# Patient Record
Sex: MALE | Race: WHITE | NOT HISPANIC OR LATINO | Employment: OTHER
[De-identification: names, ages, dates, MRNs, and addresses within clinical notes are randomized per-mention and may not be internally consistent; named-entity substitution may affect disease eponyms.]

---

## 2017-01-11 ENCOUNTER — RECORDS - HEALTHEAST (OUTPATIENT)
Dept: ADMINISTRATIVE | Facility: OTHER | Age: 67
End: 2017-01-11

## 2017-01-13 ENCOUNTER — HOSPITAL ENCOUNTER (OUTPATIENT)
Dept: ULTRASOUND IMAGING | Facility: HOSPITAL | Age: 67
Discharge: HOME | End: 2017-01-13
Attending: SPECIALIST

## 2017-01-13 DIAGNOSIS — N43.2 OTHER HYDROCELE: ICD-10-CM

## 2017-04-06 ASSESSMENT — MIFFLIN-ST. JEOR: SCORE: 2030.45

## 2017-04-10 ENCOUNTER — SURGERY - HEALTHEAST (OUTPATIENT)
Dept: SURGERY | Facility: CLINIC | Age: 67
End: 2017-04-10

## 2017-04-10 ENCOUNTER — ANESTHESIA - HEALTHEAST (OUTPATIENT)
Dept: SURGERY | Facility: CLINIC | Age: 67
End: 2017-04-10

## 2019-01-24 ENCOUNTER — RECORDS - HEALTHEAST (OUTPATIENT)
Dept: LAB | Facility: CLINIC | Age: 69
End: 2019-01-24

## 2019-01-24 LAB
ALBUMIN SERPL-MCNC: 4.3 G/DL (ref 3.5–5)
ALP SERPL-CCNC: 53 U/L (ref 45–120)
ALT SERPL W P-5'-P-CCNC: 51 U/L (ref 0–45)
ANION GAP SERPL CALCULATED.3IONS-SCNC: 15 MMOL/L (ref 5–18)
AST SERPL W P-5'-P-CCNC: 44 U/L (ref 0–40)
BASOPHILS # BLD AUTO: 0 THOU/UL (ref 0–0.2)
BASOPHILS NFR BLD AUTO: 0 % (ref 0–2)
BILIRUB SERPL-MCNC: 1 MG/DL (ref 0–1)
BUN SERPL-MCNC: 19 MG/DL (ref 8–22)
CALCIUM SERPL-MCNC: 9.5 MG/DL (ref 8.5–10.5)
CHLORIDE BLD-SCNC: 102 MMOL/L (ref 98–107)
CHOLEST SERPL-MCNC: 124 MG/DL
CO2 SERPL-SCNC: 25 MMOL/L (ref 22–31)
CREAT SERPL-MCNC: 0.86 MG/DL (ref 0.7–1.3)
EOSINOPHIL # BLD AUTO: 0.1 THOU/UL (ref 0–0.4)
EOSINOPHIL NFR BLD AUTO: 1 % (ref 0–6)
ERYTHROCYTE [DISTWIDTH] IN BLOOD BY AUTOMATED COUNT: 12.2 % (ref 11–14.5)
FASTING STATUS PATIENT QL REPORTED: ABNORMAL
GFR SERPL CREATININE-BSD FRML MDRD: >60 ML/MIN/1.73M2
GLUCOSE BLD-MCNC: 93 MG/DL (ref 70–125)
HCT VFR BLD AUTO: 48.7 % (ref 40–54)
HDLC SERPL-MCNC: 36 MG/DL
HGB BLD-MCNC: 16.5 G/DL (ref 14–18)
LDLC SERPL CALC-MCNC: 57 MG/DL
LYMPHOCYTES # BLD AUTO: 2.4 THOU/UL (ref 0.8–4.4)
LYMPHOCYTES NFR BLD AUTO: 38 % (ref 20–40)
MCH RBC QN AUTO: 31.9 PG (ref 27–34)
MCHC RBC AUTO-ENTMCNC: 33.9 G/DL (ref 32–36)
MCV RBC AUTO: 94 FL (ref 80–100)
MONOCYTES # BLD AUTO: 0.6 THOU/UL (ref 0–0.9)
MONOCYTES NFR BLD AUTO: 9 % (ref 2–10)
NEUTROPHILS # BLD AUTO: 3.2 THOU/UL (ref 2–7.7)
NEUTROPHILS NFR BLD AUTO: 51 % (ref 50–70)
PLATELET # BLD AUTO: 237 THOU/UL (ref 140–440)
PMV BLD AUTO: 10.4 FL (ref 8.5–12.5)
POTASSIUM BLD-SCNC: 4.1 MMOL/L (ref 3.5–5)
PROT SERPL-MCNC: 7.5 G/DL (ref 6–8)
RBC # BLD AUTO: 5.18 MILL/UL (ref 4.4–6.2)
SODIUM SERPL-SCNC: 142 MMOL/L (ref 136–145)
TRIGL SERPL-MCNC: 155 MG/DL
WBC: 6.3 THOU/UL (ref 4–11)

## 2020-02-12 ENCOUNTER — RECORDS - HEALTHEAST (OUTPATIENT)
Dept: LAB | Facility: CLINIC | Age: 70
End: 2020-02-12

## 2020-02-12 LAB
ALBUMIN SERPL-MCNC: 4.1 G/DL (ref 3.5–5)
ALP SERPL-CCNC: 55 U/L (ref 45–120)
ALT SERPL W P-5'-P-CCNC: 62 U/L (ref 0–45)
ANION GAP SERPL CALCULATED.3IONS-SCNC: 11 MMOL/L (ref 5–18)
AST SERPL W P-5'-P-CCNC: 53 U/L (ref 0–40)
BILIRUB SERPL-MCNC: 0.7 MG/DL (ref 0–1)
BUN SERPL-MCNC: 16 MG/DL (ref 8–22)
CALCIUM SERPL-MCNC: 9.6 MG/DL (ref 8.5–10.5)
CHLORIDE BLD-SCNC: 103 MMOL/L (ref 98–107)
CHOLEST SERPL-MCNC: 118 MG/DL
CO2 SERPL-SCNC: 29 MMOL/L (ref 22–31)
CREAT SERPL-MCNC: 0.82 MG/DL (ref 0.7–1.3)
FASTING STATUS PATIENT QL REPORTED: YES
GFR SERPL CREATININE-BSD FRML MDRD: >60 ML/MIN/1.73M2
GLUCOSE BLD-MCNC: 111 MG/DL (ref 70–125)
HDLC SERPL-MCNC: 41 MG/DL
LDLC SERPL CALC-MCNC: 56 MG/DL
POTASSIUM BLD-SCNC: 4.2 MMOL/L (ref 3.5–5)
PROT SERPL-MCNC: 7.4 G/DL (ref 6–8)
SODIUM SERPL-SCNC: 143 MMOL/L (ref 136–145)
TRIGL SERPL-MCNC: 105 MG/DL

## 2020-03-19 ENCOUNTER — RECORDS - HEALTHEAST (OUTPATIENT)
Dept: ADMINISTRATIVE | Facility: OTHER | Age: 70
End: 2020-03-19

## 2020-04-01 ENCOUNTER — HOSPITAL ENCOUNTER (OUTPATIENT)
Dept: CARDIOLOGY | Facility: HOSPITAL | Age: 70
Discharge: HOME | End: 2020-04-01
Attending: FAMILY MEDICINE

## 2020-04-01 ENCOUNTER — HOSPITAL ENCOUNTER (OUTPATIENT)
Dept: NUCLEAR MEDICINE | Facility: HOSPITAL | Age: 70
Discharge: HOME | End: 2020-04-01
Attending: FAMILY MEDICINE

## 2020-04-01 DIAGNOSIS — I25.9 ISCHEMIC HEART DISEASE: ICD-10-CM

## 2020-04-01 LAB
CV STRESS CURRENT BP HE: NORMAL
CV STRESS CURRENT HR HE: 101
CV STRESS CURRENT HR HE: 102
CV STRESS CURRENT HR HE: 103
CV STRESS CURRENT HR HE: 104
CV STRESS CURRENT HR HE: 106
CV STRESS CURRENT HR HE: 106
CV STRESS CURRENT HR HE: 108
CV STRESS CURRENT HR HE: 114
CV STRESS CURRENT HR HE: 115
CV STRESS CURRENT HR HE: 115
CV STRESS CURRENT HR HE: 117
CV STRESS CURRENT HR HE: 89
CV STRESS CURRENT HR HE: 90
CV STRESS CURRENT HR HE: 92
CV STRESS DEVIATION TIME HE: NORMAL
CV STRESS ECHO PERCENT HR HE: NORMAL
CV STRESS EXERCISE STAGE HE: NORMAL
CV STRESS FINAL RESTING BP HE: NORMAL
CV STRESS FINAL RESTING HR HE: 102
CV STRESS MAX HR HE: 117
CV STRESS MAX TREADMILL GRADE HE: 0
CV STRESS MAX TREADMILL SPEED HE: 0
CV STRESS PEAK DIA BP HE: NORMAL
CV STRESS PEAK SYS BP HE: NORMAL
CV STRESS PHASE HE: NORMAL
CV STRESS PROTOCOL HE: NORMAL
CV STRESS RESTING PT POSITION HE: NORMAL
CV STRESS ST DEVIATION AMOUNT HE: NORMAL
CV STRESS ST DEVIATION ELEVATION HE: NORMAL
CV STRESS ST EVELATION AMOUNT HE: NORMAL
CV STRESS TEST TYPE HE: NORMAL
CV STRESS TOTAL STAGE TIME MIN 1 HE: NORMAL
NUC STRESS EJECTION FRACTION: 74 %
RATE PRESSURE PRODUCT: NORMAL
STRESS ECHO BASELINE DIASTOLIC HE: 91
STRESS ECHO BASELINE HR: 91
STRESS ECHO BASELINE SYSTOLIC BP: 136
STRESS ECHO CALCULATED PERCENT HR: 76 %
STRESS ECHO LAST STRESS DIASTOLIC BP: 78
STRESS ECHO LAST STRESS HR: 106
STRESS ECHO LAST STRESS SYSTOLIC BP: 125
STRESS ECHO TARGET HR: 153

## 2020-04-01 RX ORDER — ASPIRIN 325 MG
325 TABLET ORAL DAILY
Status: SHIPPED | COMMUNITY
Start: 2020-04-01

## 2021-01-27 ENCOUNTER — RECORDS - HEALTHEAST (OUTPATIENT)
Dept: LAB | Facility: CLINIC | Age: 71
End: 2021-01-27

## 2021-01-27 LAB
ALBUMIN SERPL-MCNC: 4.1 G/DL (ref 3.5–5)
ALP SERPL-CCNC: 58 U/L (ref 45–120)
ALT SERPL W P-5'-P-CCNC: 64 U/L (ref 0–45)
ANION GAP SERPL CALCULATED.3IONS-SCNC: 10 MMOL/L (ref 5–18)
AST SERPL W P-5'-P-CCNC: 64 U/L (ref 0–40)
BILIRUB SERPL-MCNC: 0.9 MG/DL (ref 0–1)
BUN SERPL-MCNC: 12 MG/DL (ref 8–22)
CALCIUM SERPL-MCNC: 9.1 MG/DL (ref 8.5–10.5)
CHLORIDE BLD-SCNC: 101 MMOL/L (ref 98–107)
CHOLEST SERPL-MCNC: 114 MG/DL
CO2 SERPL-SCNC: 30 MMOL/L (ref 22–31)
CREAT SERPL-MCNC: 0.79 MG/DL (ref 0.7–1.3)
FASTING STATUS PATIENT QL REPORTED: ABNORMAL
GFR SERPL CREATININE-BSD FRML MDRD: >60 ML/MIN/1.73M2
GLUCOSE BLD-MCNC: 119 MG/DL (ref 70–125)
HDLC SERPL-MCNC: 35 MG/DL
LDLC SERPL CALC-MCNC: 49 MG/DL
POTASSIUM BLD-SCNC: 4.3 MMOL/L (ref 3.5–5)
PROT SERPL-MCNC: 7.5 G/DL (ref 6–8)
PSA SERPL-MCNC: 0.7 NG/ML (ref 0–4.5)
SODIUM SERPL-SCNC: 141 MMOL/L (ref 136–145)
TRIGL SERPL-MCNC: 151 MG/DL

## 2021-01-29 ENCOUNTER — AMBULATORY - HEALTHEAST (OUTPATIENT)
Dept: ADMINISTRATIVE | Facility: CLINIC | Age: 71
End: 2021-01-29

## 2021-01-29 ENCOUNTER — COMMUNICATION - HEALTHEAST (OUTPATIENT)
Dept: SURGERY | Facility: CLINIC | Age: 71
End: 2021-01-29

## 2021-01-29 ENCOUNTER — OFFICE VISIT - HEALTHEAST (OUTPATIENT)
Dept: SURGERY | Facility: CLINIC | Age: 71
End: 2021-01-29

## 2021-01-29 DIAGNOSIS — K40.90 LEFT INGUINAL HERNIA: ICD-10-CM

## 2021-01-29 RX ORDER — LOSARTAN POTASSIUM 50 MG/1
50 TABLET ORAL DAILY
Status: SHIPPED | COMMUNITY
Start: 2021-01-23

## 2021-01-29 RX ORDER — HYDROCHLOROTHIAZIDE 12.5 MG/1
TABLET ORAL DAILY
Status: SHIPPED | COMMUNITY
Start: 2021-01-23

## 2021-01-31 ENCOUNTER — AMBULATORY - HEALTHEAST (OUTPATIENT)
Dept: SURGERY | Facility: HOSPITAL | Age: 71
End: 2021-01-31

## 2021-01-31 DIAGNOSIS — Z11.59 ENCOUNTER FOR SCREENING FOR OTHER VIRAL DISEASES: ICD-10-CM

## 2021-02-15 ENCOUNTER — AMBULATORY - HEALTHEAST (OUTPATIENT)
Dept: LAB | Facility: CLINIC | Age: 71
End: 2021-02-15

## 2021-02-15 ENCOUNTER — COMMUNICATION - HEALTHEAST (OUTPATIENT)
Dept: LAB | Facility: CLINIC | Age: 71
End: 2021-02-15

## 2021-02-15 DIAGNOSIS — Z11.59 ENCOUNTER FOR SCREENING FOR OTHER VIRAL DISEASES: ICD-10-CM

## 2021-02-15 LAB
SARS-COV-2 PCR COMMENT: ABNORMAL
SARS-COV-2 RNA SPEC QL NAA+PROBE: POSITIVE
SARS-COV-2 VIRUS SPECIMEN SOURCE: ABNORMAL

## 2021-02-16 ENCOUNTER — COMMUNICATION - HEALTHEAST (OUTPATIENT)
Dept: SURGERY | Facility: CLINIC | Age: 71
End: 2021-02-16

## 2021-02-17 ASSESSMENT — MIFFLIN-ST. JEOR: SCORE: 2025.92

## 2021-03-08 ENCOUNTER — RECORDS - HEALTHEAST (OUTPATIENT)
Dept: LAB | Facility: CLINIC | Age: 71
End: 2021-03-08

## 2021-03-10 ENCOUNTER — ANESTHESIA - HEALTHEAST (OUTPATIENT)
Dept: SURGERY | Facility: HOSPITAL | Age: 71
End: 2021-03-10

## 2021-03-10 LAB — BACTERIA SPEC CULT: NORMAL

## 2021-03-11 ENCOUNTER — SURGERY - HEALTHEAST (OUTPATIENT)
Dept: SURGERY | Facility: HOSPITAL | Age: 71
End: 2021-03-11

## 2021-03-11 ASSESSMENT — MIFFLIN-ST. JEOR: SCORE: 2025.92

## 2021-03-22 ENCOUNTER — OFFICE VISIT - HEALTHEAST (OUTPATIENT)
Dept: SURGERY | Facility: CLINIC | Age: 71
End: 2021-03-22

## 2021-03-22 DIAGNOSIS — K40.01 BILATERAL RECURRENT INGUINAL HERNIA WITH OBSTRUCTION AND WITHOUT GANGRENE: ICD-10-CM

## 2021-05-27 VITALS — SYSTOLIC BLOOD PRESSURE: 136 MMHG | DIASTOLIC BLOOD PRESSURE: 76 MMHG

## 2021-05-30 VITALS — HEIGHT: 69 IN | BODY MASS INDEX: 41.47 KG/M2 | WEIGHT: 280 LBS

## 2021-06-05 VITALS — HEIGHT: 69 IN | BODY MASS INDEX: 41.32 KG/M2 | WEIGHT: 279 LBS

## 2021-06-09 NOTE — ANESTHESIA PREPROCEDURE EVALUATION
Anesthesia Evaluation      Patient summary reviewed     Airway   Mallampati: II   Pulmonary - negative ROS and normal exam                          Cardiovascular - normal exam  (+) hypertension, ,      Neuro/Psych - negative ROS     Endo/Other    (+) obesity,      GI/Hepatic/Renal    (+) GERD,             Dental                         Anesthesia Plan  Planned anesthetic: general endotracheal    ASA 3   Induction: intravenous   Anesthetic plan and risks discussed with: patient            Yovany Oro

## 2021-06-09 NOTE — ANESTHESIA POSTPROCEDURE EVALUATION
Patient: Bubba Mclaughlin  RIGHT SCROTAL EXPLORATION, HYDROCELECTOMY,  RIGHT ORCHIECTOMY  Anesthesia type: general    Patient location: Phase II Recovery  Last vitals:   Vitals:    04/10/17 1130   BP: 138/74   Pulse: 88   Resp: 16   Temp:    SpO2: 96%     Post vital signs: stable  Level of consciousness: awake and responds to simple questions  Post-anesthesia pain: pain controlled  Post-anesthesia nausea and vomiting: no  Pulmonary: unassisted, return to baseline  Cardiovascular: stable and blood pressure at baseline  Hydration: adequate  Anesthetic events: no    QCDR Measures:  ASA# 11 - Leeanne-op Cardiac Arrest: ASA11B - Patient did NOT experience unanticipated cardiac arrest  ASA# 12 - Leeanne-op Mortality Rate: ASA12B - Patient did NOT die  ASA# 13 - PACU Re-Intubation Rate: ASA13B - Patient did NOT require a new airway mgmt  ASA# 10 - Composite Anes Safety: ASA10A - No serious adverse event  ASA# 38 - New Corneal Injury: ASA38A - No new exposure keratitis or corneal abrasion in PACU    Additional Notes:    Yovany Oro

## 2021-06-09 NOTE — ANESTHESIA CARE TRANSFER NOTE
Last vitals:   Vitals:    04/10/17 1007   BP: (!) 149/92   Pulse: 71   Resp: 12   Temp:    SpO2: 93%     Patient's level of consciousness is awake  Spontaneous respirations: yes  Maintains airway independently: yes  Dentition unchanged: yes  Oropharynx: oropharynx clear of all foreign objects    QCDR Measures:  ASA# 20 - Surgical Safety Checklist: ASA20A - Safety Checks Done  PQRS# 430 - Adult PONV Prevention: 4558F - Pt received => 2 anti-emetic agents (different classes) preop & intraop  ASA# 8 - Peds PONV Prevention: 4558F - Pt received => 2 anti-emetic agents (different classes) preop & intraop  PQRS# 424 - Leeanne-op Temp Management: 4559F - At least one body temp DOCUMENTED => 35.5C or 95.9F within required timeframe  PQRS# 426 - PACU Transfer Protocol: - Transfer of care checklist used  ASA# 14 - Acute Post-op Pain: ASA14B - Patient did NOT experience pain >= 7 out of 10   The patient is Spont Breathing, responding to commands,  Reflexes  Intact.  VSS    I completed my SBAR handoff to the receiving nurse per policy and procedure.

## 2021-06-11 ENCOUNTER — OFFICE VISIT - HEALTHEAST (OUTPATIENT)
Dept: SURGERY | Facility: CLINIC | Age: 71
End: 2021-06-11

## 2021-06-11 DIAGNOSIS — N50.89 OTHER SPECIFIED DISORDERS OF THE MALE GENITAL ORGANS: ICD-10-CM

## 2021-06-11 DIAGNOSIS — N50.89 SCROTAL MASS: ICD-10-CM

## 2021-06-11 DIAGNOSIS — K40.90 LEFT INGUINAL HERNIA: ICD-10-CM

## 2021-06-14 ENCOUNTER — AMBULATORY - HEALTHEAST (OUTPATIENT)
Dept: SURGERY | Facility: HOSPITAL | Age: 71
End: 2021-06-14

## 2021-06-14 DIAGNOSIS — Z11.59 ENCOUNTER FOR SCREENING FOR OTHER VIRAL DISEASES: ICD-10-CM

## 2021-06-15 NOTE — TELEPHONE ENCOUNTER
Coronavirus (COVID-19) Notification    Reason for call  Notify of POSITIVE  COVID-19 lab result, assess symptoms,  review [facility] recommendations    Lab Result   Lab test for 2019-nCoV rRt-PCR or SARS-COV-2 PCR  Oropharyngeal AND/OR nasopharyngeal swabs were POSITIVE for 2019-nCoV RNA [OR] SARS-COV-2 RNA (COVID-19) RNA     We have been unable to reach Patient by phone at this time to notify of their Positive COVID-19 result.  Left voicemail message requesting a call back to [phone] [facility] for results.        POSITIVE COVID-19 Letter sent.    Rosemarie Adamson LPN

## 2021-06-15 NOTE — TELEPHONE ENCOUNTER
Spoke with Bubba regarding his upcoming surgery and positive covid test yesterday. Informed Bubba that we do have to reschedule. Bubba agreed.     New Surgery Date: 3/11/21  Check in time: Approx. 7:00 AM    Bubba still has his surgery instructions and did not need new instructions.  No new covid test required for 90 days. Patient is aware.    Lucy WOOD  Surgery Scheduler  [facility]  Surgery [facility]  Direct line: [phone]   Main Line: [phone]  Direct Fax: [phone]

## 2021-06-15 NOTE — ANESTHESIA PREPROCEDURE EVALUATION
Anesthesia Evaluation      Patient summary reviewed   No history of anesthetic complications     Airway   Mallampati: II  Neck ROM: full   Pulmonary - negative ROS and normal exam   (-) shortness of breath                         Cardiovascular - normal exam  Exercise tolerance: > or = 4 METS  (+) hypertension, CAD (normal stress test march 2020), , hypercholesterolemia,     (-) angina  ECG reviewed        Neuro/Psych - negative ROS     Endo/Other    (+) arthritis, obesity (morbid),      GI/Hepatic/Renal    (+) GERD well controlled,             Dental - normal exam                        Anesthesia Plan  Planned anesthetic: general endotracheal  50 mg ketamine IV on induction.  Patient is COVID recovered since he had COVID last month; denies residual symptoms.    ASA 3   Induction: intravenous   Anesthetic plan and risks discussed with: patient  Anesthesia plan special considerations: antiemetics,   Post-op plan: routine recovery

## 2021-06-15 NOTE — TELEPHONE ENCOUNTER
"-Coronavirus (COVID-19) Notification    Caller Name (Patient, parent, daughter/son, grandparent, etc)  bam Mac    Reason for call  Notify of Positive Coronavirus (COVID-19) lab results, assess symptoms,  review  [facility] recommendations    Lab Result    Lab test:  2019-nCoV rRt-PCR or SARS-CoV-2 PCR    Oropharyngeal AND/OR nasopharyngeal swabs is POSITIVE for 2019-nCoV RNA/SARS-COV-2 PCR (COVID-19 virus)    RN Recommendations/Instructions per [facility] Coronavirus COVID-19 recommendations    Brief introduction script  Introduce self and then review script:  \"I am calling on behalf of [email or web address].  We were notified that your Coronavirus test (COVID-19) for was POSITIVE for the virus.  I have some information to relay to you but first I wanted to mention that the [place] Dept of Health will be contacting you shortly [it's possible MD already called Patient] to talk to you more about how you are feeling and other people you have had contact with who might now also have the virus.  Also,  [facility] is Partnering with the [facility] for Covid-19 research, you may be contacted directly by research staff.\"    Assessment (Inquire about Patient's current symptoms)   Assessment   Current Symptoms at time of phone call: (if no symptoms, document No symptoms] No symptoms   Symptom onset (if applicable) N/A     If at time of call, Patients symptoms hare worsened, the Patient should contact 911 or have someone drive them to Emergency Dept promptly:      If Patient calling 911, inform 911 personal that you have tested positive for the Coronavirus (COVID-19).  Place mask on and await 911 to arrive.    If Emergency Dept, If possible, please have another adult drive you to the Emergency Dept but you need to wear mask when in contact with other people.        Monoclonal Antibody Administration    If you had symptoms when tested for COVID19, you may be eligible to receive a new treatment with a " "monoclonal antibody for preventing hospitalization in patients at high risk for complications from COVID-19.   This medication is still experimental and available on a limited basis; it is given through an IV and must be given at an infusion center. Please note that not all people who are eligible will receive the medication since it is in limited supply.    Are you interested in being considered for this medication?  No.  Does the patient fit the criteria: Patient declined    If patient qualifies based on above criteria:  \"We will contact you if you are selected to receive the medication in the next 1-2 days.   This is time sensitive and if you are not selected in the next 1-2 days, you will not receive the medication.  If you do not receive a call to schedule, you have not been selected.\"    Review information with Patient    Your result was positive. This means you have COVID-19 (coronavirus).  We have sent you a letter that reviews the information that I'll be reviewing with you now.    How can I protect others?    If you have symptoms: stay home and away from others (self-isolate) until:    You've had no fever--and no medicine that reduces fever--for 1 full day (24 hours). And      Your other symptoms have gotten better. For example, your cough or breathing has improved. And     At least 10 days have passed since your symptoms started. (If you ve been told by a doctor that you have a weak immune system, wait 20 days.)     If you don't have symptoms: Stay home and away from others (self-isolate) until at least 10 days have passed since your first positive COVID-19 test. (Date test collected).    During this time:    Stay in your own room, including for meals. Use your own bathroom if you can.    Stay away from others in your home. No hugging, kissing or shaking hands. No visitors.     Don't go to work, school or anywhere else.     Clean  high touch  surfaces often (doorknobs, counters, handles, etc.). Use a " household cleaning spray or wipes. You'll find a full list on the EPA website at www.epa.gov/pesticide-registration/list-n-disinfectants-use-against-sars-cov-2.     Cover your mouth and nose with a mask, tissue or other face covering to avoid spreading germs.    Wash your hands and face often with soap and water.    Make a list of people you have been in close contact with recently, even if either of you wore a face covering.   ; Start your list from 2 days before you became ill or had a positive test.  ; Include anyone that was within 6 feet of you for a cumulative total of 15 minutes or more in 24 hours. (Example: if you sat next to Bubba for 5 minutes in the morning and 10 minutes in the afternoon, then you were in close contact for 15 minutes total that day. Bubba would be added to your list.)    A public health worker will call or text you. It is important that you answer. They will ask you questions about possible exposures to COVID-19, such as people you have been in direct contact with and places you have visited.    Tell the people on your list that you have COVID-19; they should stay away from others for 14 days starting from the last time they were in contact with you (unless you are told something different from a public health worker).     Caregivers in these groups are at risk for severe illness due to COVID-19:  o People 65 years and older  o People who live in a nursing home or long-term care facility  o People with chronic disease (lung, heart, cancer, diabetes, kidney, liver, immunologic)  o People who have a weakened immune system, including those who:  - Are in cancer treatment  - Take medicine that weakens the immune system, such as corticosteroids  - Had a bone marrow or organ transplant  - Have an immune deficiency  - Have poorly controlled HIV or AIDS  - Are obese (body mass index of 40 or higher)  - Smoke regularly    Caregivers should wear gloves while washing dishes, handling laundry and  cleaning bedrooms and bathrooms.    Wash and dry laundry with special caution. Don't shake dirty laundry, and use the warmest water setting you can.    If you have a weakened immune system, ask your doctor about other actions you should take.    For more tips, go to www.cdc.gov/coronavirus/2019-ncov/downloads/10Things.pdf.    You should not go back to work until you meet the guidelines above for ending your home isolation. You don't need to be retested for COVID-19 before going back to work--studies show that you won't spread the virus if it's been at least 10 days since your symptoms started (or 20 days, if you have a weak immune system).    Employers: This document serves as formal notice of your employee's medical guidelines for going back to work. They must meet the above guidelines before going back to work in person.    How can I take care of myself?    1. Get lots of rest. Drink extra fluids (unless a doctor has told you not to).    2. Take Tylenol (acetaminophen) for fever or pain. If you have liver or kidney problems, ask your family doctor if it's okay to take Tylenol.     Take either:     650 mg (two 325 mg pills) every 4 to 6 hours, or     1,000 mg (two 500 mg pills) every 8 hours as needed.     Note: Don't take more than 3,000 mg in one day. Acetaminophen is found in many medicines (both prescribed and over-the-counter medicines). Read all labels to be sure you don't take too much.    For children, check the Tylenol bottle for the right dose (based on their age or weight).    3. If you have other health problems (like cancer, heart failure, an organ transplant or severe kidney disease): Call your specialty clinic if you don't feel better in the next 2 days.    4. Know when to call 911: Emergency warning signs include:    Trouble breathing or shortness of breath    Pain or pressure in the chest that doesn't go away    Feeling confused like you haven't felt before, or not being able to wake  up    Bluish-colored lips or face    5. Sign up for [employer]. We know it's scary to hear that you have COVID-19. We want to track your symptoms to make sure you're okay over the next 2 weeks. Please look for an email from [employer]--this is a free, online program that we'll use to keep in touch. To sign up, follow the link in the email. Learn more at [email or web address].    Where can I get more information?    [facility]: [email or web address]    Coronavirus Basics: www.health.[email or web address]./diseases/coronavirus/basics.html    What to Do If You're Sick: www.cdc.gov/coronavirus/2019-ncov/about/steps-when-sick.html    Ending Home Isolation: www.cdc.gov/coronavirus/2019-ncov/hcp/disposition-in-home-patients.html     Caring for Someone with COVID-19: www.cdc.gov/coronavirus/2019-ncov/if-you-are-sick/care-for-someone.html     [facility] clinical trials (COVID-19 research studies): clinicalaffairs.[facility]-clinical-trials     A Positive COVID-19 letter will be sent via [employer] or the Mail.  (Exception, no letters sent to Presurgerical/Preprocedure Patients)    Krista Mcpherson LPN

## 2021-06-15 NOTE — ANESTHESIA CARE TRANSFER NOTE
Last vitals:   Vitals:    03/11/21 0959   BP: 134/84   Pulse: 81   Resp: 12   Temp: 36.9  C (98.5  F)   SpO2: 97%     Patient's level of consciousness is awake  Spontaneous respirations: yes  Maintains airway independently: yes  Dentition unchanged: yes  Oropharynx: oropharynx clear of all foreign objects    QCDR Measures:  ASA# 20 - Surgical Safety Checklist: WHO surgical safety checklist completed prior to induction    PQRS# 430 - Adult PONV Prevention: 4558F - Pt received => 2 anti-emetic agents (different classes) preop & intraop  ASA# 8 - Peds PONV Prevention: NA - Not pediatric patient, not GA or 2 or more risk factors NOT present  PQRS# 424 - Leeanne-op Temp Management: 4559F - At least one body temp DOCUMENTED => 35.5C or 95.9F within required timeframe  PQRS# 426 - PACU Transfer Protocol: - Transfer of care checklist used  ASA# 14 - Acute Post-op Pain: ASA14B - Patient did NOT experience pain >= 7 out of 10

## 2021-06-15 NOTE — ANESTHESIA POSTPROCEDURE EVALUATION
Patient: Bubba Mclaughlin  Procedure(s):  HERNIORRHAPHY ,BILATERAL  INGUINAL, ROBOT-ASSISTED, LAPAROSCOPIC, USING DA DONALD XI WITH MESH (Bilateral)  Anesthesia type: general    Patient location: PACU  Last vitals:   Vitals Value Taken Time   /79 03/11/21 1115   Temp 37  C (98.6  F) 03/11/21 1109   Pulse 87 03/11/21 1118   Resp 16 03/11/21 1109   SpO2 92 % 03/11/21 1118   Vitals shown include unvalidated device data.  Post vital signs: stable  Level of consciousness: awake and responds to simple questions  Post-anesthesia pain: pain controlled  Post-anesthesia nausea and vomiting: no  Pulmonary: unassisted, return to baseline  Cardiovascular: stable and blood pressure at baseline  Hydration: adequate  Anesthetic events: no    QCDR Measures:  ASA# 11 - Leeanne-op Cardiac Arrest: ASA11B - Patient did NOT experience unanticipated cardiac arrest  ASA# 12 - Leeanne-op Mortality Rate: ASA12B - Patient did NOT die  ASA# 13 - PACU Re-Intubation Rate: ASA13B - Patient did NOT require a new airway mgmt  ASA# 10 - Composite Anes Safety: ASA10A - No serious adverse event    Additional Notes:

## 2021-06-16 PROBLEM — M17.9 OSTEOARTHRITIS OF KNEE: Status: ACTIVE | Noted: 2021-01-29

## 2021-06-16 PROBLEM — I25.9 ISCHEMIC HEART DISEASE: Status: ACTIVE | Noted: 2021-01-29

## 2021-06-16 PROBLEM — R06.02 SHORTNESS OF BREATH: Status: ACTIVE | Noted: 2021-01-29

## 2021-06-16 PROBLEM — R42 VERTIGO: Status: ACTIVE | Noted: 2021-01-29

## 2021-06-16 PROBLEM — R05.9 COUGH: Status: ACTIVE | Noted: 2021-01-29

## 2021-06-16 PROBLEM — R74.8 ELEVATED LIVER ENZYMES: Status: ACTIVE | Noted: 2021-01-29

## 2021-06-16 PROBLEM — E66.01 MORBID OBESITY (H): Status: ACTIVE | Noted: 2021-01-29

## 2021-06-16 PROBLEM — E88.09 HYPOALBUMINEMIA: Status: ACTIVE | Noted: 2021-01-29

## 2021-06-16 PROBLEM — N43.3 HYDROCELE: Status: ACTIVE | Noted: 2021-01-29

## 2021-06-16 PROBLEM — K21.9 GASTRO-ESOPHAGEAL REFLUX DISEASE WITHOUT ESOPHAGITIS: Status: ACTIVE | Noted: 2021-06-11

## 2021-06-16 PROBLEM — L03.90 CELLULITIS: Status: ACTIVE | Noted: 2021-01-29

## 2021-06-16 PROBLEM — K40.90 LEFT INGUINAL HERNIA: Status: ACTIVE | Noted: 2021-01-29

## 2021-06-16 PROBLEM — N50.89 SCROTAL MASS: Status: ACTIVE | Noted: 2021-06-14

## 2021-06-16 PROBLEM — E78.5 HYPERLIPIDEMIA: Status: ACTIVE | Noted: 2017-06-01

## 2021-06-16 PROBLEM — K40.20 BILATERAL INGUINAL HERNIA WITHOUT OBSTRUCTION OR GANGRENE, RECURRENCE NOT SPECIFIED: Status: ACTIVE | Noted: 2021-01-29

## 2021-06-16 PROBLEM — Z98.890 OTHER SPECIFIED POSTPROCEDURAL STATES: Status: ACTIVE | Noted: 2021-01-29

## 2021-06-16 NOTE — PROGRESS NOTES
HPI: Pt is here for follow up of a bilateral robotic inguinal hernia repair.  He is doing well. His appetite is good, and bowel function regular.  No fevers or chills. Ambulating without problems.       There were no vitals taken for this visit.      EXAM: This is a  70 y.o. MAN in no distress  GENERAL: Appears well  CHEST clear  CVS S1S2 NSR  ABDOMEN: Soft, non-tender. incisions look good, repairs intact  EXT: warm, moves without difficulty      Assessment/Plan:   S/P robotic hernia repair  Doing well  Can return to normal activities.  Call for questions or concerns       Demian Vazquez MD  [facility] Department of Surgery

## 2021-06-21 ENCOUNTER — AMBULATORY - HEALTHEAST (OUTPATIENT)
Dept: LAB | Facility: CLINIC | Age: 71
End: 2021-06-21

## 2021-06-21 DIAGNOSIS — Z11.59 ENCOUNTER FOR SCREENING FOR OTHER VIRAL DISEASES: ICD-10-CM

## 2021-06-22 ENCOUNTER — COMMUNICATION - HEALTHEAST (OUTPATIENT)
Dept: SURGERY | Facility: CLINIC | Age: 71
End: 2021-06-22

## 2021-06-26 NOTE — TELEPHONE ENCOUNTER
Coronavirus (COVID-19) Notification    Reason for call  Notify of POSITIVE  COVID-19 lab result, assess symptoms,  review [facility] recommendations    Lab Result   Lab test for 2019-nCoV rRt-PCR or SARS-COV-2 PCR  Oropharyngeal AND/OR nasopharyngeal swabs were POSITIVE for 2019-nCoV RNA [OR] SARS-COV-2 RNA (COVID-19) RNA     We have been unable to reach Patient by phone at this time to notify of their Positive COVID-19 result.  Left voicemail message requesting a call back to [phone] [facility] for results.        POSITIVE COVID-19 Letter sent.    Renetta Portillo LPN

## 2021-06-26 NOTE — TELEPHONE ENCOUNTER
"-Coronavirus (COVID-19) Notification    Caller Name (Patient, parent, daughter/son, grandparent, etc)  Patient   Patient is planning surgery and agrees to discuss this test with them ASAP.  Patient had tested positive for Covid in Feb 2021 and is fully vaccinated.     Reason for call  Notify of Positive Coronavirus (COVID-19) lab results, assess symptoms,  review [facility] recommendations    Lab Result    Lab test:  2019-nCoV rRt-PCR or SARS-CoV-2 PCR    Oropharyngeal AND/OR nasopharyngeal swabs is POSITIVE for 2019-nCoV RNA/SARS-COV-2 PCR (COVID-19 virus)    RN Recommendations/Instructions per [facility] Coronavirus COVID-19 recommendations    Brief introduction script  Introduce self and then review script:  \"I am calling on behalf of [employer].  We were notified that your Coronavirus test (COVID-19) for was POSITIVE for the virus.  I have some information to relay to you but first I wanted to mention that the [place] Dept of Health will be contacting you shortly [it's possible MD already called Patient] to talk to you more about how you are feeling and other people you have had contact with who might now also have the virus.  Also, [facility] is Partnering with the [facility] for Covid-19 research, you may be contacted directly by research staff.\"    Assessment (Inquire about Patient's current symptoms)   Assessment   Current Symptoms at time of phone call: (if no symptoms, document No symptoms] No Sx   Symptom onset (if applicable) Tested positive yesterday     If at time of call, Patients symptoms hare worsened, the Patient should contact [facility] or have someone drive them to Emergency Dept promptly:      If Patient calling 911, inform 911 personal that you have tested positive for the Coronavirus (COVID-19).  Place mask on and await 911 to arrive.    If Emergency Dept, If possible, please have another adult drive you to the Emergency Dept but you need to wear mask when in contact " "with other people.      Monoclonal Antibody Administration    You may be eligible to receive a new treatment with a monoclonal antibody for preventing hospitalization in patients at high risk for complications from COVID-19.   This medication is still experimental and available on a limited basis; it is given through an IV and must be given at an infusion center. Please note that not all people who are eligible will receive the medication since it is in limited supply.     Are you interested in being considered for this medication?  No.  Does the patient fit the criteria: No    If patient qualifies based on above criteria:  \"You will be contacted if you are selected to receive this treatment in the next 1-2 business days.   This is time sensitive and if you are not selected in the next 1-2 business days, you will not receive the medication.  If you do not receive a call to schedule, you have not been selected.\"    Review information with Patient    Your result was positive. This means you have COVID-19 (coronavirus).  We have sent you a letter that reviews the information that I'll be reviewing with you now.    How can I protect others?    If you have symptoms: stay home and away from others (self-isolate) until:    You've had no fever--and no medicine that reduces fever--for 1 full day (24 hours). And      Your other symptoms have gotten better. For example, your cough or breathing has improved. And     At least 10 days have passed since your symptoms started. (If you ve been told by a doctor that you have a weak immune system, wait 20 days.)     If you don't have symptoms: Stay home and away from others (self-isolate) until at least 10 days have passed since your first positive COVID-19 test. (Date test collected).    During this time:    Stay in your own room, including for meals. Use your own bathroom if you can.    Stay away from others in your home. No hugging, kissing or shaking hands. No visitors.     Don't " go to work, school or anywhere else.     Clean  high touch  surfaces often (doorknobs, counters, handles, etc.). Use a household cleaning spray or wipes. You'll find a full list on the EPA website at www.epa.gov/pesticide-registration/list-n-disinfectants-use-against-sars-cov-2.     Cover your mouth and nose with a mask, tissue or other face covering to avoid spreading germs.    Wash your hands and face often with soap and water.    Make a list of people you have been in close contact with recently, even if either of you wore a face covering.   ; Start your list from 2 days before you became ill or had a positive test.  ; Include anyone that was within 6 feet of you for a cumulative total of 15 minutes or more in 24 hours. (Example: if you sat next to Bubba for 5 minutes in the morning and 10 minutes in the afternoon, then you were in close contact for 15 minutes total that day. Bubba would be added to your list.)    A public health worker will call or text you. It is important that you answer. They will ask you questions about possible exposures to COVID-19, such as people you have been in direct contact with and places you have visited.    Tell the people on your list that you have COVID-19; they should stay away from others for 14 days starting from the last time they were in contact with you (unless you are told something different from a public health worker).     Caregivers in these groups are at risk for severe illness due to COVID-19:  o People 65 years and older  o People who live in a nursing home or long-term care facility  o People with chronic disease (lung, heart, cancer, diabetes, kidney, liver, immunologic)  o People who have a weakened immune system, including those who:  - Are in cancer treatment  - Take medicine that weakens the immune system, such as corticosteroids  - Had a bone marrow or organ transplant  - Have an immune deficiency  - Have poorly controlled HIV or AIDS  - Are obese (body mass  index of 40 or higher)  - Smoke regularly    Caregivers should wear gloves while washing dishes, handling laundry and cleaning bedrooms and bathrooms.    Wash and dry laundry with special caution. Don't shake dirty laundry, and use the warmest water setting you can.    If you have a weakened immune system, ask your doctor about other actions you should take.    For more tips, go to www.cdc.gov/coronavirus/2019-ncov/downloads/10Things.pdf.    You should not go back to work until you meet the guidelines above for ending your home isolation. You don't need to be retested for COVID-19 before going back to work--studies show that you won't spread the virus if it's been at least 10 days since your symptoms started (or 20 days, if you have a weak immune system).    Employers: This document serves as formal notice of your employee's medical guidelines for going back to work. They must meet the above guidelines before going back to work in person.    How can I take care of myself?    1. Get lots of rest. Drink extra fluids (unless a doctor has told you not to).    2. Take Tylenol (acetaminophen) for fever or pain. If you have liver or kidney problems, ask your family doctor if it's okay to take Tylenol.     Take either:     650 mg (two 325 mg pills) every 4 to 6 hours, or     1,000 mg (two 500 mg pills) every 8 hours as needed.     Note: Don't take more than 3,000 mg in one day. Acetaminophen is found in many medicines (both prescribed and over-the-counter medicines). Read all labels to be sure you don't take too much.    For children, check the Tylenol bottle for the right dose (based on their age or weight).    3. If you have other health problems (like cancer, heart failure, an organ transplant or severe kidney disease): Call your specialty clinic if you don't feel better in the next 2 days.    4. Know when to call 911: Emergency warning signs include:    Trouble breathing or shortness of breath    Pain or pressure in the  chest that doesn't go away    Feeling confused like you haven't felt before, or not being able to wake up    Bluish-colored lips or face    5. Sign up for "[employer]. We know it's scary to hear that you have COVID-19. We want to track your symptoms to make sure you're okay over the next 2 weeks. Please look for an email from "[employer]--this is a free, online program that we'll use to keep in touch. To sign up, follow the link in the email. Learn more at [email or web address].    Where can I get more information?    [facility]: [email or web address]    Coronavirus Basics: www.health.[email or web address].[place]./diseases/coronavirus/basics.html    What to Do If You're Sick: www.cdc.gov/coronavirus/2019-ncov/about/steps-when-sick.html    Ending Home Isolation: www.cdc.gov/coronavirus/2019-ncov/hcp/disposition-in-home-patients.html     Caring for Someone with COVID-19: www.cdc.gov/coronavirus/2019-ncov/if-you-are-sick/care-for-someone.html     [facility] clinical trials (COVID-19 research studies): [email or web address].[facility]/n-clinical-trials     A Positive COVID-19 letter will be sent via [employer] or the Mail.  (Exception, no letters sent to Presurgerical/Preprocedure Patients)    Renetta Portillo LPN

## 2021-06-30 DIAGNOSIS — Z11.59 ENCOUNTER FOR SCREENING FOR OTHER VIRAL DISEASES: ICD-10-CM

## 2021-06-30 NOTE — PROGRESS NOTES
Progress Notes by Demian Vazquez MD at 1/29/2021  2:20 PM     Author: Demian Vazquez MD Service: -- Author Type: Physician    Filed: 2/6/2021  2:19 PM Encounter Date: 1/29/2021 Status: Signed    : Demian Vazquez MD (Physician)             HPI:  This is a 70 y.o. male here today with concerns of pain and bulging in his left groin area. He has noted this for the past few month(s). The symptoms have progressed and increased over this time. He comes in for evaluation secondary to the hernia causing enough issues to bother them with daily activities or chores.    Allergies:Patient has no known allergies.    Past Medical History:   Diagnosis Date   ? Family history of myocardial infarction     brother   ? GERD (gastroesophageal reflux disease)    ? High cholesterol    ? Hypertension        Past Surgical History:   Procedure Laterality Date   ? HERNIA REPAIR     ? HYDROCELE EXCISION / REPAIR N/A 4/10/2017    Procedure: RIGHT SCROTAL EXPLORATION, HYDROCELECTOMY;  Surgeon: Rajiv Ferris MD;  Location: [facility];  Service:    ? ORCHIECTOMY Right 4/10/2017    Procedure:  RIGHT ORCHIECTOMY;  Surgeon: Rajiv Ferris MD;  Location: [facility] OR;  Service:        CURRENT MEDS:      Family History   Problem Relation Age of Onset   ? Coronary artery disease Brother    ? Heart attack Brother         reports that he has never smoked. He has never used smokeless tobacco. He reports that he does not drink alcohol or use drugs.    Review of Systems - Negative except right hydrocele and orchiectomy in the past. Left large hernia with pain. Otherwise twelve system of review is negative.      Vitals:    01/29/21 1423   BP: 136/76       There is no height or weight on file to calculate BMI.    EXAM:  General: NAD   HEENT: normocephalic, PERRLA and EOMS intact  [place]: Mucus membranes moist  Neck: Supple  Chest: Clear to auscultation bilaterally  CV: RRR  ABD: Soft nontender and nondistended, left  inguinal hernia, reducible . Absent right testicle.   EXT: Warm, pulses intact,   Neuro: Alert and oriented x3  Back: no CVA tenderness      IMAGES:     Assessment/Plan: Pt with a left inguinal hernia. I discussed this at length with He.  I went over conservative management as well as surgical treatment of this.. I would plan on doing this via anrobotic  approach with possible use of mesh. I went over the small risks of surgery including but not limited to bleeding and infection, anesthesia, recurrence rates and nerve injury. I discussed the expected recovery time as well. Will get this scheduled. He will contact us to have this scheduled.      MD Demian Rivas MD, MD  General Surgery [phone]  Vascular Surgery [phone]

## 2021-07-04 NOTE — PROGRESS NOTES
Progress Notes by Demian Vazquez MD at 6/11/2021  1:00 PM     Author: Demian Vazquez MD Service: -- Author Type: Physician    Filed: 7/4/2021  4:29 PM Encounter Date: 6/11/2021 Status: Signed    : Demian Vazquez MD (Physician)       [facility] Surgery Follow up    HPI:    70 y.o. year old male who returns for a follow up. Returns with bulge in left groin again after robotic repair. Surgery was three months ago. Some pain and discomfort.    Allergies:Patient has no known allergies.    Past Medical History:   Diagnosis Date   ? Arthritis     osteo   ? Colon polyps    ? Diverticulosis    ? Dyslipidemia    ? Family history of myocardial infarction     brother   ? GERD (gastroesophageal reflux disease)    ? Hemorrhoids    ? High cholesterol    ? Hydrocele of testis     right   ? Hypertension        Past Surgical History:   Procedure Laterality Date   ? HERNIA REPAIR  2000,2006    and Dbl. hernia 2016   ? HYDROCELE EXCISION / REPAIR N/A 04/10/2017    Procedure: RIGHT SCROTAL EXPLORATION, HYDROCELECTOMY;  Surgeon: Rajiv Ferris MD;  Location: [facility] OR;  Service:    ? ORCHIECTOMY Right 04/10/2017    Procedure:  RIGHT ORCHIECTOMY;  Surgeon: Rajiv Ferris MD;  Location: [facility] OR;  Service:    ? scrotal exploration         CURRENT MEDS:  Current Outpatient Medications on File Prior to Visit   Medication Sig Dispense Refill   ? aspirin 325 MG tablet Take 325 mg by mouth daily.     ? atorvastatin (LIPITOR) 40 MG tablet Take 40 mg by mouth daily.      ? hydroCHLOROthiazide (HYDRODIURIL) 12.5 MG tablet Take by mouth daily.      ? lactose-reduced food (BOOST HIGH PROTEIN ORAL) Take by mouth daily.     ? losartan (COZAAR) 50 MG tablet Take 50 mg by mouth daily.      ? multivitamin therapeutic (THERAGRAN) tablet Take 1 tablet by mouth daily.     ? omeprazole (PRILOSEC) 20 MG capsule TAKE 1 CAPSULE BY MOUTH ONCE DAILY 30 MINUTES BEFORE MORNING MEAL     ? psyllium (METAMUCIL) 3.4 gram  packet Take 1 packet by mouth daily.        No current facility-administered medications on file prior to visit.        Family History   Problem Relation Age of Onset   ? Coronary artery disease Brother    ? Heart attack Brother         reports that he has never smoked. He has never used smokeless tobacco. He reports that he does not drink alcohol or use drugs.    Review of Systems:  Negative except left groin bulge. Otherwise twelve system of review is negative.      OBJECTIVE:  There were no vitals filed for this visit.  There is no height or weight on file to calculate BMI.    EXAM:  GENERAL: This is a well-developed 70 y.o. male who appears his stated age  HEAD: normocephalic  HEENT: Pupils equal and reactive bilaterally  CARDIAC: RRR without murmur  CHEST/LUNG:  Clear to auscultation  ABDOMEN: Soft, nontender, nondistended, recurrent left inguinal hernia most likely on exam.  NEUROLOGIC: Focally intact, nonfocal  VASCULAR: Pulses intact, symmetrical upper and lower extremities.      LABS:  Lab Results   Component Value Date    WBC 6.3 01/24/2019    HGB 16.5 01/24/2019    HCT 48.7 01/24/2019    MCV 94 01/24/2019     01/24/2019     INR/Prothrombin Time      No results found for: HGBA1C  Lab Results   Component Value Date    ALT 64 (H) 01/27/2021    AST 64 (H) 01/27/2021    ALKPHOS 58 01/27/2021    BILITOT 0.9 01/27/2021        Images:     CT Abdomen Pelvis With Oral With IV Contrast (Order [id number])  Imaging  Date: 6/11/2021 Department: [facility] [place] Released By/Authorizing: Demian Vazquez MD   Study Result       EXAM DATE:         06/11/2021     EXAM: CT ABDOMEN, PELVIS WITH CONTRAST  LOCATION: [facility]  DATE/TIME: 6/11/2021 2:00 PM     INDICATION: ABD PAIN, FEVER, POST-OP, NO ABSCESS ON CT WITHIN THE WEEK two hernia repairs with mass in left scrotum, possible recurrence.  COMPARISON: 01/13/2017 scrotal ultrasound and  07/13/2016 CT pelvis  TECHNIQUE: CT scan of the abdomen and pelvis was performed following injection of IV contrast. Multiplanar reformats were obtained. Dose reduction techniques were used.  CONTRAST: 100 ml Isovue 370 was administered via iv with 0 mls discarded. Istat CR= 1.0mg/dl, eGFR= 74     FINDINGS:  LOWER CHEST: Coronary artery and aortic valve calcification. Heart size normal with no pericardial effusion. Visualized lungs are clear with no pleural effusion.     HEPATOBILIARY: Moderate diffuse hepatic steatosis, borderline hepatic enlargement and slight contour undulation. Liver otherwise normal. No bile duct dilatation. Mild chronic appearing irregular gallbladder wall thickening but no calcified gallstones.     PANCREAS: Normal.     SPLEEN: Spleen size normal.     ADRENAL GLANDS: Normal.     KIDNEY/BLADDER: Four nonobstructing 2 mm left renal stones. Kidneys, ureters and bladder are otherwise normal.     BOWEL: Recurrent left inguinal hernia with a 5 cm diameter defect through which a large 20 x 10 x 10 cm hernia sac passes that contains multiple loops of unobstructed small bowel. Intact right inguinal herniorrhaphy. Normal variant incomplete intestinal   malrotation with failure of the duodenum to cross midline and the cecum in the right mid abdomen. Tiny fat-containing upper anterior midline ventral hernia. No bowel obstruction or inflammatory change.     LYMPH NODES: No lymphadenopathy.     VASCULATURE: Normal caliber abdominal aorta with mild calcified atheromatous plaque.     PELVIC ORGANS: Prostate enlargement. Pelvis otherwise unremarkable.     MUSCULOSKELETAL: Unremarkable.     IMPRESSION:  1.  Recurrent left inguinal hernia with a 5 cm diameter defect through which a large 20 x 10 x 10 cm hernia sac passes that contains multiple loops of unobstructed small bowel.  2.  Normal variant incomplete intestinal malrotation with failure of the duodenum to cross midline and the cecum in the right mid  abdomen.  3.  Moderate diffuse hepatic steatosis, borderline hepatic enlargement and slight contour undulation could indicate some degree of steatohepatitis and/or fibrosis.  4.  Mild chronic appearing irregular gallbladder wall thickening but no calcified gallstones.  5.  Small nonobstructing stones left kidney.     NOTE: ABNORMAL REPORT     THE DICTATION ABOVE DESCRIBES AN ABNORMALITY FOR WHICH FOLLOW-UP IS NEEDED.            Assessment/Plan:   1. Scrotal mass  Recurrent hernia    - CT Abdomen Pelvis With Oral With IV Contrast; Future  - CT Abdomen Pelvis With Oral With IV Contrast  - Skin prep - Clip hair as needed; Standing  - Apply 2% Chlorhexidine Gluconate cloth (Delfin Cloth) to surgical area.; Standing  - Insert and maintain IV; Standing  - sodium chloride bacteriostatic 0.9 % injection 0.1-0.3 mL  - sodium chloride flush 3 mL (NS)  - NaCl 0.9% IR 0.9 % irrigation solution 1,000 mL (NS)  - Case Request: HERNIORRHAPHY, INGUINAL, ROBOT-ASSISTED, LAPAROSCOPIC, USING DA DONALD XI; Standing  - Place sequential compression device; Standing  - Verify informed consent for Anesthesia; Standing  - Verify informed consent for procedure; Standing  - ceFAZolin 3 g in dextrose 5% 100 mL (ANCEF)  - Case Request: HERNIORRHAPHY, INGUINAL, ROBOT-ASSISTED, LAPAROSCOPIC, USING DA DONALD XI    2. Left inguinal hernia  We will do a repair of this laparoscopically or robotically.  This to be done in the hospital setting..    Discussed the risks and benefits procedure with him and answered all questions he understands risks of infection bleeding recurrence rates blood loss.  And the possibility of going open    - Skin prep - Clip hair as needed; Standing  - Apply 2% Chlorhexidine Gluconate cloth (Delfin Cloth) to surgical area.; Standing  - Insert and maintain IV; Standing  - sodium chloride bacteriostatic 0.9 % injection 0.1-0.3 mL  - sodium chloride flush 3 mL (NS)  - NaCl 0.9% IR 0.9 % irrigation solution 1,000 mL (NS)  - Case  Request: HERNIORRHAPHY, INGUINAL, ROBOT-ASSISTED, LAPAROSCOPIC, USING DA DONALD XI; Standing  - Place sequential compression device; Standing  - Verify informed consent for Anesthesia; Standing  - Verify informed consent for procedure; Standing  - ceFAZolin 3 g in dextrose 5% 100 mL (ANCEF)  - Case Request: HERNIORRHAPHY, INGUINAL, ROBOT-ASSISTED, LAPAROSCOPIC, USING DA DONALD XI      No follow-ups on file.     Demian Vazquez MD  [facility] Department of Surgery

## 2021-07-27 ENCOUNTER — LAB REQUISITION (OUTPATIENT)
Dept: LAB | Facility: CLINIC | Age: 71
End: 2021-07-27

## 2021-07-27 ENCOUNTER — TRANSFERRED RECORDS (OUTPATIENT)
Dept: HEALTH INFORMATION MANAGEMENT | Facility: CLINIC | Age: 71
End: 2021-07-27

## 2021-07-27 DIAGNOSIS — I10 ESSENTIAL (PRIMARY) HYPERTENSION: ICD-10-CM

## 2021-07-27 PROCEDURE — 36415 COLL VENOUS BLD VENIPUNCTURE: CPT | Performed by: PHYSICIAN ASSISTANT

## 2021-07-27 PROCEDURE — 80048 BASIC METABOLIC PNL TOTAL CA: CPT | Performed by: PHYSICIAN ASSISTANT

## 2021-07-28 LAB
ANION GAP SERPL CALCULATED.3IONS-SCNC: 14 MMOL/L (ref 5–18)
BUN SERPL-MCNC: 16 MG/DL (ref 8–22)
CALCIUM SERPL-MCNC: 9.7 MG/DL (ref 8.5–10.5)
CHLORIDE BLD-SCNC: 105 MMOL/L (ref 98–107)
CO2 SERPL-SCNC: 27 MMOL/L (ref 22–31)
CREAT SERPL-MCNC: 0.86 MG/DL (ref 0.7–1.3)
GFR SERPL CREATININE-BSD FRML MDRD: 89 ML/MIN/1.73M2
GLUCOSE BLD-MCNC: 89 MG/DL (ref 70–125)
POTASSIUM BLD-SCNC: 4 MMOL/L (ref 3.5–5)
SODIUM SERPL-SCNC: 146 MMOL/L (ref 136–145)

## 2021-07-29 ENCOUNTER — ANESTHESIA (OUTPATIENT)
Dept: SURGERY | Facility: HOSPITAL | Age: 71
End: 2021-07-29
Payer: COMMERCIAL

## 2021-07-29 ENCOUNTER — ANESTHESIA EVENT (OUTPATIENT)
Dept: SURGERY | Facility: HOSPITAL | Age: 71
End: 2021-07-29
Payer: COMMERCIAL

## 2021-07-29 ENCOUNTER — HOSPITAL ENCOUNTER (OUTPATIENT)
Facility: HOSPITAL | Age: 71
Discharge: HOME | End: 2021-07-29
Attending: SPECIALIST | Admitting: SPECIALIST
Payer: COMMERCIAL

## 2021-07-29 VITALS
HEART RATE: 76 BPM | OXYGEN SATURATION: 96 % | SYSTOLIC BLOOD PRESSURE: 129 MMHG | DIASTOLIC BLOOD PRESSURE: 68 MMHG | RESPIRATION RATE: 20 BRPM | TEMPERATURE: 97.8 F | BODY MASS INDEX: 37.48 KG/M2 | WEIGHT: 253.8 LBS

## 2021-07-29 PROCEDURE — 258N000003 HC RX IP 258 OP 636: Performed by: ANESTHESIOLOGY

## 2021-07-29 PROCEDURE — 370N000017 HC ANESTHESIA TECHNICAL FEE, PER MIN: Performed by: SPECIALIST

## 2021-07-29 PROCEDURE — 250N000009 HC RX 250: Performed by: NURSE ANESTHETIST, CERTIFIED REGISTERED

## 2021-07-29 PROCEDURE — 710N000012 HC RECOVERY PHASE 2, PER MINUTE: Performed by: SPECIALIST

## 2021-07-29 PROCEDURE — 250N000011 HC RX IP 250 OP 636: Performed by: NURSE ANESTHETIST, CERTIFIED REGISTERED

## 2021-07-29 PROCEDURE — 360N000080 HC SURGERY LEVEL 7, PER MIN: Performed by: SPECIALIST

## 2021-07-29 PROCEDURE — C1781 MESH (IMPLANTABLE): HCPCS | Performed by: SPECIALIST

## 2021-07-29 PROCEDURE — 710N000009 HC RECOVERY PHASE 1, LEVEL 1, PER MIN: Performed by: SPECIALIST

## 2021-07-29 PROCEDURE — 250N000011 HC RX IP 250 OP 636: Performed by: SPECIALIST

## 2021-07-29 PROCEDURE — S2900 ROBOTIC SURGICAL SYSTEM: HCPCS | Performed by: SPECIALIST

## 2021-07-29 PROCEDURE — 250N000025 HC SEVOFLURANE, PER MIN: Performed by: SPECIALIST

## 2021-07-29 PROCEDURE — 272N000001 HC OR GENERAL SUPPLY STERILE: Performed by: SPECIALIST

## 2021-07-29 PROCEDURE — 49651 LAP ING HERNIA REPAIR RECUR: CPT | Mod: 22 | Performed by: SPECIALIST

## 2021-07-29 PROCEDURE — 999N000141 HC STATISTIC PRE-PROCEDURE NURSING ASSESSMENT: Performed by: SPECIALIST

## 2021-07-29 DEVICE — LAPAROSCOPIC SELF-FIXATING MESH POLYESTER WITH POLYLACTIC ACID GRIPS AND COLLAGEN FILM
Type: IMPLANTABLE DEVICE | Site: PELVIS | Status: FUNCTIONAL
Brand: PROGRIP

## 2021-07-29 RX ORDER — MEPERIDINE HYDROCHLORIDE 25 MG/ML
12.5 INJECTION INTRAMUSCULAR; INTRAVENOUS; SUBCUTANEOUS EVERY 5 MIN PRN
Status: DISCONTINUED | OUTPATIENT
Start: 2021-07-29 | End: 2021-07-29 | Stop reason: HOSPADM

## 2021-07-29 RX ORDER — MEPERIDINE HYDROCHLORIDE 25 MG/ML
12.5 INJECTION INTRAMUSCULAR; INTRAVENOUS; SUBCUTANEOUS
Status: DISCONTINUED | OUTPATIENT
Start: 2021-07-29 | End: 2021-07-29 | Stop reason: HOSPADM

## 2021-07-29 RX ORDER — SODIUM CHLORIDE, SODIUM LACTATE, POTASSIUM CHLORIDE, CALCIUM CHLORIDE 600; 310; 30; 20 MG/100ML; MG/100ML; MG/100ML; MG/100ML
INJECTION, SOLUTION INTRAVENOUS CONTINUOUS
Status: DISCONTINUED | OUTPATIENT
Start: 2021-07-29 | End: 2021-07-29 | Stop reason: HOSPADM

## 2021-07-29 RX ORDER — MAGNESIUM SULFATE HEPTAHYDRATE 40 MG/ML
2 INJECTION, SOLUTION INTRAVENOUS ONCE
Status: DISCONTINUED | OUTPATIENT
Start: 2021-07-29 | End: 2021-07-29 | Stop reason: HOSPADM

## 2021-07-29 RX ORDER — OXYCODONE HYDROCHLORIDE 5 MG/1
5 TABLET ORAL EVERY 4 HOURS PRN
Status: DISCONTINUED | OUTPATIENT
Start: 2021-07-29 | End: 2021-07-29 | Stop reason: HOSPADM

## 2021-07-29 RX ORDER — DEXAMETHASONE SODIUM PHOSPHATE 10 MG/ML
INJECTION, SOLUTION INTRAMUSCULAR; INTRAVENOUS PRN
Status: DISCONTINUED | OUTPATIENT
Start: 2021-07-29 | End: 2021-07-29

## 2021-07-29 RX ORDER — PROPOFOL 10 MG/ML
INJECTION, EMULSION INTRAVENOUS PRN
Status: DISCONTINUED | OUTPATIENT
Start: 2021-07-29 | End: 2021-07-29

## 2021-07-29 RX ORDER — BUPIVACAINE HYDROCHLORIDE 2.5 MG/ML
INJECTION, SOLUTION INFILTRATION; PERINEURAL PRN
Status: DISCONTINUED | OUTPATIENT
Start: 2021-07-29 | End: 2021-07-29 | Stop reason: HOSPADM

## 2021-07-29 RX ORDER — HYDROXYZINE HYDROCHLORIDE 10 MG/1
10 TABLET, FILM COATED ORAL EVERY 6 HOURS PRN
Status: DISCONTINUED | OUTPATIENT
Start: 2021-07-29 | End: 2021-07-29 | Stop reason: HOSPADM

## 2021-07-29 RX ORDER — NALOXONE HYDROCHLORIDE 0.4 MG/ML
0.4 INJECTION, SOLUTION INTRAMUSCULAR; INTRAVENOUS; SUBCUTANEOUS
Status: DISCONTINUED | OUTPATIENT
Start: 2021-07-29 | End: 2021-07-29 | Stop reason: HOSPADM

## 2021-07-29 RX ORDER — ONDANSETRON 2 MG/ML
4 INJECTION INTRAMUSCULAR; INTRAVENOUS EVERY 30 MIN PRN
Status: DISCONTINUED | OUTPATIENT
Start: 2021-07-29 | End: 2021-07-29 | Stop reason: HOSPADM

## 2021-07-29 RX ORDER — NALOXONE HYDROCHLORIDE 0.4 MG/ML
0.2 INJECTION, SOLUTION INTRAMUSCULAR; INTRAVENOUS; SUBCUTANEOUS
Status: DISCONTINUED | OUTPATIENT
Start: 2021-07-29 | End: 2021-07-29 | Stop reason: HOSPADM

## 2021-07-29 RX ORDER — CEFAZOLIN SODIUM 2 G/100ML
INJECTION, SOLUTION INTRAVENOUS PRN
Status: DISCONTINUED | OUTPATIENT
Start: 2021-07-29 | End: 2021-07-29

## 2021-07-29 RX ORDER — HYDROMORPHONE HCL IN WATER/PF 6 MG/30 ML
0.2 PATIENT CONTROLLED ANALGESIA SYRINGE INTRAVENOUS EVERY 5 MIN PRN
Status: DISCONTINUED | OUTPATIENT
Start: 2021-07-29 | End: 2021-07-29 | Stop reason: HOSPADM

## 2021-07-29 RX ORDER — FENTANYL CITRATE 50 UG/ML
50 INJECTION, SOLUTION INTRAMUSCULAR; INTRAVENOUS EVERY 5 MIN PRN
Status: DISCONTINUED | OUTPATIENT
Start: 2021-07-29 | End: 2021-07-29 | Stop reason: HOSPADM

## 2021-07-29 RX ORDER — ACETAMINOPHEN 325 MG/1
975 TABLET ORAL ONCE
Status: DISCONTINUED | OUTPATIENT
Start: 2021-07-29 | End: 2021-07-29 | Stop reason: HOSPADM

## 2021-07-29 RX ORDER — KETAMINE HYDROCHLORIDE 10 MG/ML
INJECTION INTRAMUSCULAR; INTRAVENOUS PRN
Status: DISCONTINUED | OUTPATIENT
Start: 2021-07-29 | End: 2021-07-29

## 2021-07-29 RX ORDER — FENTANYL CITRATE 50 UG/ML
INJECTION, SOLUTION INTRAMUSCULAR; INTRAVENOUS PRN
Status: DISCONTINUED | OUTPATIENT
Start: 2021-07-29 | End: 2021-07-29

## 2021-07-29 RX ORDER — LIDOCAINE 40 MG/G
CREAM TOPICAL
Status: DISCONTINUED | OUTPATIENT
Start: 2021-07-29 | End: 2021-07-29 | Stop reason: HOSPADM

## 2021-07-29 RX ORDER — HALOPERIDOL 5 MG/ML
1 INJECTION INTRAMUSCULAR
Status: DISCONTINUED | OUTPATIENT
Start: 2021-07-29 | End: 2021-07-29 | Stop reason: HOSPADM

## 2021-07-29 RX ORDER — KETOROLAC TROMETHAMINE 15 MG/ML
15 INJECTION, SOLUTION INTRAMUSCULAR; INTRAVENOUS
Status: DISCONTINUED | OUTPATIENT
Start: 2021-07-29 | End: 2021-07-29 | Stop reason: HOSPADM

## 2021-07-29 RX ORDER — ONDANSETRON 2 MG/ML
INJECTION INTRAMUSCULAR; INTRAVENOUS PRN
Status: DISCONTINUED | OUTPATIENT
Start: 2021-07-29 | End: 2021-07-29

## 2021-07-29 RX ORDER — ONDANSETRON 4 MG/1
4 TABLET, ORALLY DISINTEGRATING ORAL EVERY 30 MIN PRN
Status: DISCONTINUED | OUTPATIENT
Start: 2021-07-29 | End: 2021-07-29 | Stop reason: HOSPADM

## 2021-07-29 RX ORDER — GLYCOPYRROLATE 0.2 MG/ML
INJECTION, SOLUTION INTRAMUSCULAR; INTRAVENOUS PRN
Status: DISCONTINUED | OUTPATIENT
Start: 2021-07-29 | End: 2021-07-29

## 2021-07-29 RX ORDER — LIDOCAINE HYDROCHLORIDE 20 MG/ML
INJECTION, SOLUTION INFILTRATION; PERINEURAL PRN
Status: DISCONTINUED | OUTPATIENT
Start: 2021-07-29 | End: 2021-07-29

## 2021-07-29 RX ADMIN — ROCURONIUM BROMIDE 10 MG: 10 INJECTION, SOLUTION INTRAVENOUS at 13:26

## 2021-07-29 RX ADMIN — ROCURONIUM BROMIDE 10 MG: 10 INJECTION, SOLUTION INTRAVENOUS at 14:05

## 2021-07-29 RX ADMIN — DEXAMETHASONE SODIUM PHOSPHATE 10 MG: 10 INJECTION, SOLUTION INTRAMUSCULAR; INTRAVENOUS at 12:35

## 2021-07-29 RX ADMIN — ROCURONIUM BROMIDE 50 MG: 10 INJECTION, SOLUTION INTRAVENOUS at 12:35

## 2021-07-29 RX ADMIN — LIDOCAINE HYDROCHLORIDE 60 MG: 20 INJECTION, SOLUTION INFILTRATION; PERINEURAL at 12:35

## 2021-07-29 RX ADMIN — ONDANSETRON 4 MG: 2 INJECTION INTRAMUSCULAR; INTRAVENOUS at 14:36

## 2021-07-29 RX ADMIN — SUGAMMADEX 400 MG: 100 INJECTION, SOLUTION INTRAVENOUS at 14:42

## 2021-07-29 RX ADMIN — SODIUM CHLORIDE, POTASSIUM CHLORIDE, SODIUM LACTATE AND CALCIUM CHLORIDE: 600; 310; 30; 20 INJECTION, SOLUTION INTRAVENOUS at 14:17

## 2021-07-29 RX ADMIN — SODIUM CHLORIDE, POTASSIUM CHLORIDE, SODIUM LACTATE AND CALCIUM CHLORIDE: 600; 310; 30; 20 INJECTION, SOLUTION INTRAVENOUS at 11:12

## 2021-07-29 RX ADMIN — FENTANYL CITRATE 50 MCG: 50 INJECTION, SOLUTION INTRAMUSCULAR; INTRAVENOUS at 14:14

## 2021-07-29 RX ADMIN — PROPOFOL 200 MG: 10 INJECTION, EMULSION INTRAVENOUS at 12:35

## 2021-07-29 RX ADMIN — KETAMINE HYDROCHLORIDE 50 MG: 10 INJECTION, SOLUTION INTRAMUSCULAR; INTRAVENOUS at 13:01

## 2021-07-29 RX ADMIN — FENTANYL CITRATE 100 MCG: 50 INJECTION, SOLUTION INTRAMUSCULAR; INTRAVENOUS at 12:35

## 2021-07-29 RX ADMIN — CEFAZOLIN SODIUM 2 G: 2 INJECTION, SOLUTION INTRAVENOUS at 12:40

## 2021-07-29 RX ADMIN — ROCURONIUM BROMIDE 10 MG: 10 INJECTION, SOLUTION INTRAVENOUS at 13:46

## 2021-07-29 RX ADMIN — GLYCOPYRROLATE 0.2 MCG: 0.2 INJECTION, SOLUTION INTRAMUSCULAR; INTRAVENOUS at 12:53

## 2021-07-29 RX ADMIN — FENTANYL CITRATE 50 MCG: 50 INJECTION, SOLUTION INTRAMUSCULAR; INTRAVENOUS at 13:46

## 2021-07-29 NOTE — OP NOTE
[facility] [place]  Operative Note    Pre-operative diagnosis: Scrotal mass [N50.89]  Left inguinal hernia [K40.90]   Post-operative diagnosis recurrent left inguinal hernia   Procedure: Procedure(s):  HERNIORRHAPHY, INGUINAL, ROBOT-ASSISTED, LAPAROSCOPIC, USING DA DONALD XI   Surgeon: Demian Vazquez MD   Assistants(s): none   Anesthesia: General    Estimated blood loss: Less than 10 ml    Total IV fluids: (See anesthesia record)   Blood transfusion: No transfusion was given during surgery   Total urine output: (See anesthesia record)   Drains: None   Specimens: None   Implants: Mesh progrip left inguinal   Findings:  Large recurrent left indirect hernia.  Difficult anatomy secondary to prior repairs x3 with really no visualized normal anatomy.  Severely thinned out peritoneum secondary to his prior repairs     Complications: None.   Condition: Stable       Description of procedure:      Consent obtained taken up and placed in supine position general endotracheal anesthesia was administered by anesthesia staff the Acosta was placed in the office prepped and draped sterile manner.  A briefing and timeout was performed anesthesia staff and or staff..  I began first by making incisions through the old robotic incisions and Visiport 5 mm scope was used to gain access to the peritoneal cavity.  We insufflated space to pressure 15 mils mercury CO2.  Upon doing so I placed an 8 mm robotic trocar lateral to this another area but it millimeter alert trocar lateral to that.  On the right and left sides.  Replaced the midline 5 mm trocar to a millimeter robotic trocar.  We docked the robot at this time point I changed my position over to the console.  At this time point I started to come the peritoneum in the same exact plane that had been done before I dissected the space with electrocautery very difficult dissection secondary to scarring from the prior pair in so doing eventually came in on account of the  old mesh which is rolled up and scarred anterior under the rectus muscle and I could see also mesh down in the hernia space from a prior repair as before.  I was able to get dissection eventually slowly meticulously and free of most of this peritoneum there were some peritoneal rents in seriousness in the process and so doing I placed a piece of appropriate progrip mesh in the space this tacked over the vascular structures and lied nicely cross midline over to the to Christopher's ligament and late anterior and to Christopher's ligament I sutured this superiorly with a 3-0 Vicryl suture to the rectus muscles in the lateral muscles out lateral and at this time point was pretty happy with where the mesh laid a difficult anatomy with a was to find the space for the vasculature was to the spermatic cord was presumed to be, scarred against abdominal wall inferiorly but it was not a very easy dissection or to find this.  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  At this time point I was happy with the repair and actually now spent at the next 20 minutes just repairing the peritoneum which I did so with 3 oh V-Loc suture.  X3 upon question of this I removed all the needles and we undocked the robot.  All sponge needle counts are correct ulcers were closed with 4-0 Monocryl subcuticular stitch Steri-Strips and sterile drapes were applied patient was extubated transferred to PACU in stable condition.  I did notice that injected 40 Marcaine incisions were leaving and he also got Toradol IV before leaving.        Demian Vazquez MD  General Surgery [phone]  Vascular Surgery [phone]

## 2021-07-29 NOTE — ANESTHESIA PROCEDURE NOTES
Airway       Patient location during procedure: OR       Procedure Start/Stop Times: 7/29/2021 12:37 PM  Staff -        Anesthesiologist:  Dorota Lopez MD       CRNA: Renetta Soriano APRN CRNA       Performed By: CRNA  Consent for Airway        Urgency: elective  Indications and Patient Condition       Indications for airway management: miguel-procedural       Induction type:intravenous       Mask difficulty assessment: 2 - vent by mask + OA or adjuvant +/- NMBA    Final Airway Details       Final airway type: endotracheal airway       Successful airway: ETT - single  Endotracheal Airway Details        ETT size (mm): 8.0       Cuffed: yes       Successful intubation technique: direct laryngoscopy       DL Blade Type: Chan 2       Grade View of Cords: 1       Adjucts: stylet and tooth guard       Position: Right       Measured from: gums/teeth       Secured at (cm): 22    Post intubation assessment        Placement verified by: capnometry, equal breath sounds and chest rise        Number of attempts at approach: 1       Secured with: silk tape       Ease of procedure: easy       Dentition: Intact and Unchanged

## 2021-07-29 NOTE — ANESTHESIA PREPROCEDURE EVALUATION
Anesthesia Pre-Procedure Evaluation    Patient: Bubba Mclaughlin   MRN: [id number] : 1950        Preoperative Diagnosis: Scrotal mass [N50.89]  Left inguinal hernia [K40.90]   Procedure : Procedure(s):  HERNIORRHAPHY, INGUINAL, ROBOT-ASSISTED, LAPAROSCOPIC, USING DA DONALD XI     Past Medical History:   Diagnosis Date     Arthritis     osteo     Colon polyps      Diverticulosis      Dyslipidemia      Family history of myocardial infarction     brother     GERD (gastroesophageal reflux disease)      Hemorrhoids      High cholesterol      Hydrocele of testis     right     Hypertension       Past Surgical History:   Procedure Laterality Date     HERNIA REPAIR  ,    and Dbl. hernia 2016     HYDROCELECTOMY SCROTAL N/A 04/10/2017    Procedure: RIGHT SCROTAL EXPLORATION, HYDROCELECTOMY;  Surgeon: Rajiv Ferris MD;  Location: [facility] Main OR;  Service:      ORCHIECTOMY SCROTAL Right 04/10/2017    Procedure:  RIGHT ORCHIECTOMY;  Surgeon: Rajiv Ferris MD;  Location: [facility] Main OR;  Service:      OTHER SURGICAL HISTORY      scrotal exploration      No Known Allergies   Social History     Tobacco Use     Smoking status: Never Smoker     Smokeless tobacco: Never Used   Substance Use Topics     Alcohol use: No      Wt Readings from Last 1 Encounters:   21 115.1 kg (253 lb 12.8 oz)        Anesthesia Evaluation            ROS/MED HX  ENT/Pulmonary:  - neg pulmonary ROS     Neurologic:  - neg neurologic ROS     Cardiovascular:     (+) hypertension-----    METS/Exercise Tolerance:     Hematologic:  - neg hematologic  ROS     Musculoskeletal:       GI/Hepatic:     (+) GERD,     Renal/Genitourinary:       Endo:     (+) Obesity,     Psychiatric/Substance Use:  - neg psychiatric ROS     Infectious Disease:       Malignancy:       Other:            Physical Exam    Airway  airway exam normal           Respiratory Devices and Support         Dental  no notable dental history         Cardiovascular    cardiovascular exam normal          Pulmonary   pulmonary exam normal                OUTSIDE LABS:  CBC:   Lab Results   Component Value Date    WBC 6.3 01/24/2019    HGB 16.5 01/24/2019    HCT 48.7 01/24/2019     01/24/2019     BMP:   Lab Results   Component Value Date     (H) 07/27/2021     01/27/2021    POTASSIUM 4.0 07/27/2021    POTASSIUM 4.3 01/27/2021    CHLORIDE 105 07/27/2021    CHLORIDE 101 01/27/2021    CO2 27 07/27/2021    CO2 30 01/27/2021    BUN 16 07/27/2021    BUN 12 01/27/2021    CR 0.86 07/27/2021    CR 0.79 01/27/2021    GLC 89 07/27/2021     01/27/2021     COAGS: No results found for: PTT, INR, FIBR  POC: No results found for: BGM, HCG, HCGS  HEPATIC:   Lab Results   Component Value Date    ALBUMIN 4.1 01/27/2021    PROTTOTAL 7.5 01/27/2021    ALT 64 (H) 01/27/2021    AST 64 (H) 01/27/2021    ALKPHOS 58 01/27/2021    BILITOTAL 0.9 01/27/2021     OTHER:   Lab Results   Component Value Date    GUSTAVO 9.7 07/27/2021       Anesthesia Plan    ASA Status:  3      Anesthesia Type: General.     - Airway: ETT   Induction: Intravenous.   Maintenance: Balanced.        Consents    Anesthesia Plan(s) and associated risks, benefits, and realistic alternatives discussed. Questions answered and patient/representative(s) expressed understanding.     - Discussed with:  Patient         Postoperative Care    Pain management: IV analgesics, Oral pain medications.   PONV prophylaxis: Ondansetron (or other 5HT-3), Dexamethasone or Solumedrol     Comments:                Dorota Lopez MD

## 2021-07-29 NOTE — OR NURSING
Patient asymptomatic post covid infection.  Several Positive covid tests previously (February 2021 and June 2021).  No covid test done today.       Laurie Chan paged regarding question on Covid status.  No response. Renetta SENA CRNA called and reported that Dr. Lopez was notified and patient was treated as recovered covid patient.

## 2021-07-29 NOTE — INTERVAL H&P NOTE
I have reviewed the surgical (or preoperative) H&P that is linked to this encounter, and examined the patient. There are no significant changes.        Demian Vazquez MD  General Surgery [phone]  Vascular Surgery [phone]

## 2021-07-29 NOTE — OR NURSING
Spoke with Laurie Chan, Infectious Disease, OK to not have patient in isolation.  OK not to have tested patient for covid today.

## 2021-07-29 NOTE — ANESTHESIA CARE TRANSFER NOTE
Patient: Bubba Mclaughlin    Procedure(s):  HERNIORRHAPHY, INGUINAL, ROBOT-ASSISTED, LAPAROSCOPIC, USING DA DONALD XI    Diagnosis: Scrotal mass [N50.89]  Left inguinal hernia [K40.90]  Diagnosis Additional Information: No value filed.    Anesthesia Type:   General     Note:    Oropharynx: oropharynx clear of all foreign objects and spontaneously breathing  Level of Consciousness: awake and drowsy  Oxygen Supplementation: face mask  Level of Supplemental Oxygen (L/min / FiO2): 6  Independent Airway: airway patency satisfactory and stable  Dentition: dentition unchanged  Vital Signs Stable: post-procedure vital signs reviewed and stable  Report to RN Given: handoff report given  Patient transferred to: PACU    Handoff Report: Identifed the Patient, Identified the Reponsible Provider, Reviewed the pertinent medical history, Discussed the surgical course, Reviewed Intra-OP anesthesia mangement and issues during anesthesia, Set expectations for post-procedure period and Allowed opportunity for questions and acknowledgement of understanding      Vitals:  Vitals Value Taken Time   /85 07/29/21 1453   Temp 36.4  C (97.5  F) 07/29/21 1453   Pulse 78 07/29/21 1456   Resp 24 07/29/21 1456   SpO2 97 % 07/29/21 1456   Vitals shown include unvalidated device data.    Electronically Signed By: DARIA Del Castillo CRNA  July 29, 2021  2:57 PM

## 2021-07-30 NOTE — ANESTHESIA POSTPROCEDURE EVALUATION
Patient: Bubba Mclaughlin    Procedure(s):  HERNIORRHAPHY, INGUINAL, ROBOT-ASSISTED, LAPAROSCOPIC, USING DA DONALD XI    Diagnosis:Scrotal mass [N50.89]  Left inguinal hernia [K40.90]  Diagnosis Additional Information: No value filed.    Anesthesia Type:  General    Note:  Disposition: Outpatient   Postop Pain Control: Uneventful            Sign Out: Well controlled pain   PONV: No   Neuro/Psych: Uneventful            Sign Out: Acceptable/Baseline neuro status   Airway/Respiratory: Uneventful            Sign Out: Acceptable/Baseline resp. status   CV/Hemodynamics: Uneventful            Sign Out: Acceptable CV status; No obvious hypovolemia; No obvious fluid overload   Other NRE: NONE   DID A NON-ROUTINE EVENT OCCUR? No           Last vitals:  Vitals Value Taken Time   /74 07/29/21 1600   Temp 36.4  C (97.6  F) 07/29/21 1545   Pulse 69 07/29/21 1612   Resp 33 07/29/21 1611   SpO2 98 % 07/29/21 1613   Vitals shown include unvalidated device data.    Electronically Signed By: Malik Sands MD  July 29, 2021  7:17 PM

## 2021-08-09 ENCOUNTER — LAB REQUISITION (OUTPATIENT)
Dept: LAB | Facility: CLINIC | Age: 71
End: 2021-08-09
Payer: COMMERCIAL

## 2021-08-09 DIAGNOSIS — Z03.818 ENCOUNTER FOR OBSERVATION FOR SUSPECTED EXPOSURE TO OTHER BIOLOGICAL AGENTS RULED OUT: ICD-10-CM

## 2021-08-09 PROCEDURE — U0005 INFEC AGEN DETEC AMPLI PROBE: HCPCS | Mod: ORL | Performed by: FAMILY MEDICINE

## 2021-08-09 PROCEDURE — 86769 SARS-COV-2 COVID-19 ANTIBODY: CPT | Mod: ORL | Performed by: FAMILY MEDICINE

## 2021-08-10 LAB — SARS-COV-2 RNA RESP QL NAA+PROBE: NEGATIVE

## 2021-08-16 ENCOUNTER — OFFICE VISIT (OUTPATIENT)
Dept: SURGERY | Facility: CLINIC | Age: 71
End: 2021-08-16
Payer: COMMERCIAL

## 2021-08-16 VITALS — SYSTOLIC BLOOD PRESSURE: 128 MMHG | DIASTOLIC BLOOD PRESSURE: 76 MMHG

## 2021-08-16 DIAGNOSIS — K40.90 LEFT INGUINAL HERNIA: Primary | ICD-10-CM

## 2021-08-16 PROCEDURE — 99024 POSTOP FOLLOW-UP VISIT: CPT | Performed by: SPECIALIST

## 2021-08-16 NOTE — LETTER
8/16/2021         RE: Bubba Mclaughlin  [address] [place] [address]        Dear Colleague,    Thank you for referring your patient, Bubba Mclaughlin, to the [facility] [place]. Please see a copy of my visit note below.    [facility] Surgery Follow up    HPI:    70 year old year old male who returns for a follow up. S/P a robotic redo left inguinal hernia repair. Doing well swelling of left scrotum decreasing but still there.    Allergies:Patient has no known allergies.    Past Medical History:   Diagnosis Date     Arthritis     osteo     Colon polyps      Diverticulosis      Dyslipidemia      Family history of myocardial infarction     brother     GERD (gastroesophageal reflux disease)      Hemorrhoids      High cholesterol      Hydrocele of testis     right     Hypertension        Past Surgical History:   Procedure Laterality Date     DAVINCI XI HERNIORRHAPHY INGUINAL Left 7/29/2021    Procedure: HERNIORRHAPHY, INGUINAL, ROBOT-ASSISTED, LAPAROSCOPIC, USING DA DONALD XI;  Surgeon: Demian Vazquez MD;  [place] [facility] [place]  [place]    and [facility] hernia 2016     HYDROCELECTOMY SCROTAL N/A 04/10/2017    Procedure: RIGHT SCROTAL EXPLORATION, HYDROCELECTOMY;  Surgeon: Rajiv Ferris MD;  Location: [facility];  Service:      ORCHIECTOMY SCROTAL Right 04/10/2017    Procedure:  RIGHT ORCHIECTOMY;  Surgeon: Rajiv Ferris MD;  Location: [facility];  Service:      OTHER SURGICAL HISTORY      scrotal exploration       CURRENT MEDS:  Current Outpatient Medications   Medication     aspirin 325 MG tablet     atorvastatin (LIPITOR) 40 MG tablet     hydroCHLOROthiazide (HYDRODIURIL) 12.5 MG tablet     lactose-reduced food (BOOST HIGH PROTEIN ORAL)     losartan (COZAAR) 50 MG tablet     multivitamin therapeutic (THERAGRAN) tablet     omeprazole (PRILOSEC) 20 MG capsule     psyllium (METAMUCIL) 3.4 gram packet     No current  facility-administered medications for this visit.       Family History   Problem Relation Age of Onset     Coronary Artery Disease Brother         reports that he has never smoked. He has never used smokeless tobacco. He reports that he does not drink alcohol and does not use drugs.    Review of Systems:  Negative except still some scrotal swelling, no pain and no groin bulging. Otherwise twelve system of review is negative.      OBJECTIVE:  Vitals:    08/16/21 0852   BP: 128/76   BP Location: Right arm   Patient Position: Sitting   Cuff Size: Adult Regular     There is no height or weight on file to calculate BMI.    EXAM:  GENERAL: This is a well-developed 70 year old male who appears his stated age  HEAD: normocephalic  HEENT: Pupils equal and reactive bilaterally  CARDIAC: RRR without murmur  CHEST/LUNG:  Clear to auscultation  ABDOMEN: Soft, nontender, nondistended, no masses, repair intact, seroma in left scrotum from repair.   NEUROLOGIC: Focally intact, nonfocal  VASCULAR: Pulses intact, symmetrical upper and lower extremities.        LABS:  Lab Results   Component Value Date    WBC 6.3 01/24/2019    HGB 16.5 01/24/2019    HCT 48.7 01/24/2019    MCV 94 01/24/2019     01/24/2019     INR/Prothrombin Time  @LABRCNTIP(NA,K,CL,co2,bun,creatinine,labglom,glucose,calcium)@  No results found for: HGBA1C  Lab Results   Component Value Date    ALT 64 (H) 01/27/2021    AST 64 (H) 01/27/2021    ALKPHOS 58 01/27/2021          Assessment/Plan:   S/P robotic redo repair of left inguinal repair.   Doing ok,  Seroma is decreasing.  2-3 more weeks of restricted activity.   Follow up as needed.     No follow-ups on file.     Demian Vazquez MD ,MD  [facility] Department of Surgery      Again, thank you for allowing me to participate in the care of your patient.        Sincerely,        Demian Vazquez MD

## 2021-08-18 NOTE — PROGRESS NOTES
[facility] Surgery Follow up    HPI:    70 year old year old male who returns for a follow up. S/P a robotic redo left inguinal hernia repair. Doing well swelling of left scrotum decreasing but still there.    Allergies:Patient has no known allergies.    Past Medical History:   Diagnosis Date     Arthritis     osteo     Colon polyps      Diverticulosis      Dyslipidemia      Family history of myocardial infarction     brother     GERD (gastroesophageal reflux disease)      Hemorrhoids      High cholesterol      Hydrocele of testis     right     Hypertension        Past Surgical History:   Procedure Laterality Date     DAVINCI XI HERNIORRHAPHY INGUINAL Left 7/29/2021    Procedure: HERNIORRHAPHY, INGUINAL, ROBOT-ASSISTED, LAPAROSCOPIC, USING DA DONALD XI;  Surgeon: Demian Vazquez MD;  [place] [facility] [place]     HERNIA REPAIR  2000,2006    and [facility]. hernia 2016     HYDROCELECTOMY SCROTAL N/A 04/10/2017    Procedure: RIGHT SCROTAL EXPLORATION, HYDROCELECTOMY;  Surgeon: Rajiv Ferris MD;  Location: [facility];  Service:      ORCHIECTOMY SCROTAL Right 04/10/2017    Procedure:  RIGHT ORCHIECTOMY;  Surgeon: Rajiv Ferris MD;  Location: [facility];  Service:      OTHER SURGICAL HISTORY      scrotal exploration       CURRENT MEDS:  Current Outpatient Medications   Medication     aspirin 325 MG tablet     atorvastatin (LIPITOR) 40 MG tablet     hydroCHLOROthiazide (HYDRODIURIL) 12.5 MG tablet     lactose-reduced food (BOOST HIGH PROTEIN ORAL)     losartan (COZAAR) 50 MG tablet     multivitamin therapeutic (THERAGRAN) tablet     omeprazole (PRILOSEC) 20 MG capsule     psyllium (METAMUCIL) 3.4 gram packet     No current facility-administered medications for this visit.       Family History   Problem Relation Age of Onset     Coronary Artery Disease Brother         reports that he has never smoked. He has never used smokeless tobacco. He reports that he does not drink alcohol and does not use  drugs.    Review of Systems:  Negative except still some scrotal swelling, no pain and no groin bulging. Otherwise twelve system of review is negative.      OBJECTIVE:  Vitals:    08/16/21 0852   BP: 128/76   BP Location: Right arm   Patient Position: Sitting   Cuff Size: Adult Regular     There is no height or weight on file to calculate BMI.    EXAM:  GENERAL: This is a well-developed 70 year old male who appears his stated age  HEAD: normocephalic  HEENT: Pupils equal and reactive bilaterally  CARDIAC: RRR without murmur  CHEST/LUNG:  Clear to auscultation  ABDOMEN: Soft, nontender, nondistended, no masses, repair intact, seroma in left scrotum from repair.   NEUROLOGIC: Focally intact, nonfocal  VASCULAR: Pulses intact, symmetrical upper and lower extremities.        LABS:  Lab Results   Component Value Date    WBC 6.3 01/24/2019    HGB 16.5 01/24/2019    HCT 48.7 01/24/2019    MCV 94 01/24/2019     01/24/2019     INR/Prothrombin Time  @LABRCNTIP(NA,K,CL,co2,bun,creatinine,labglom,glucose,calcium)@  No results found for: HGBA1C  Lab Results   Component Value Date    ALT 64 (H) 01/27/2021    AST 64 (H) 01/27/2021    ALKPHOS 58 01/27/2021          Assessment/Plan:   S/P robotic redo repair of left inguinal repair.   Doing ok,  Seroma is decreasing.  2-3 more weeks of restricted activity.   Follow up as needed.     No follow-ups on file.     Demian Vazquez MD ,MD  [facility] Department of Surgery

## 2021-08-19 LAB
SARS-COV-2 AB SERPL IA-ACNC: NORMAL [IU]/ML
SARS-COV-2 AB SERPL-IMP: POSITIVE

## 2021-08-26 ENCOUNTER — LAB REQUISITION (OUTPATIENT)
Dept: LAB | Facility: CLINIC | Age: 71
End: 2021-08-26

## 2021-08-26 DIAGNOSIS — E87.0 HYPEROSMOLALITY AND HYPERNATREMIA: ICD-10-CM

## 2021-08-26 LAB — SODIUM SERPL-SCNC: 141 MMOL/L (ref 136–145)

## 2021-08-26 PROCEDURE — 84295 ASSAY OF SERUM SODIUM: CPT | Performed by: PHYSICIAN ASSISTANT

## 2021-10-05 ENCOUNTER — LAB REQUISITION (OUTPATIENT)
Dept: LAB | Facility: CLINIC | Age: 71
End: 2021-10-05
Payer: COMMERCIAL

## 2021-10-05 DIAGNOSIS — Z03.818 ENCOUNTER FOR OBSERVATION FOR SUSPECTED EXPOSURE TO OTHER BIOLOGICAL AGENTS RULED OUT: ICD-10-CM

## 2021-10-05 PROCEDURE — U0003 INFECTIOUS AGENT DETECTION BY NUCLEIC ACID (DNA OR RNA); SEVERE ACUTE RESPIRATORY SYNDROME CORONAVIRUS 2 (SARS-COV-2) (CORONAVIRUS DISEASE [COVID-19]), AMPLIFIED PROBE TECHNIQUE, MAKING USE OF HIGH THROUGHPUT TECHNOLOGIES AS DESCRIBED BY CMS-2020-01-R: HCPCS | Mod: ORL | Performed by: PHYSICIAN ASSISTANT

## 2021-10-06 LAB — SARS-COV-2 RNA RESP QL NAA+PROBE: NEGATIVE

## 2022-06-01 ENCOUNTER — LAB REQUISITION (OUTPATIENT)
Dept: LAB | Facility: CLINIC | Age: 72
End: 2022-06-01

## 2022-06-01 DIAGNOSIS — E78.5 HYPERLIPIDEMIA, UNSPECIFIED: ICD-10-CM

## 2022-06-01 DIAGNOSIS — I10 ESSENTIAL (PRIMARY) HYPERTENSION: ICD-10-CM

## 2022-06-01 DIAGNOSIS — Z12.5 ENCOUNTER FOR SCREENING FOR MALIGNANT NEOPLASM OF PROSTATE: ICD-10-CM

## 2022-06-01 LAB
ALBUMIN SERPL-MCNC: 3.8 G/DL (ref 3.5–5)
ALP SERPL-CCNC: 67 U/L (ref 45–120)
ALT SERPL W P-5'-P-CCNC: 33 U/L (ref 0–45)
ANION GAP SERPL CALCULATED.3IONS-SCNC: 15 MMOL/L (ref 5–18)
AST SERPL W P-5'-P-CCNC: 37 U/L (ref 0–40)
BILIRUB SERPL-MCNC: 1.2 MG/DL (ref 0–1)
BUN SERPL-MCNC: 14 MG/DL (ref 8–22)
CALCIUM SERPL-MCNC: 9.2 MG/DL (ref 8.5–10.5)
CHLORIDE BLD-SCNC: 105 MMOL/L (ref 98–107)
CHOLEST SERPL-MCNC: 95 MG/DL
CO2 SERPL-SCNC: 23 MMOL/L (ref 22–31)
CREAT SERPL-MCNC: 0.77 MG/DL (ref 0.7–1.3)
FASTING STATUS PATIENT QL REPORTED: ABNORMAL
GFR SERPL CREATININE-BSD FRML MDRD: >90 ML/MIN/1.73M2
GLUCOSE BLD-MCNC: 112 MG/DL (ref 70–125)
HDLC SERPL-MCNC: 33 MG/DL
LDLC SERPL CALC-MCNC: 37 MG/DL
POTASSIUM BLD-SCNC: 4.2 MMOL/L (ref 3.5–5)
PROT SERPL-MCNC: 7.2 G/DL (ref 6–8)
PSA SERPL-MCNC: 0.72 UG/L (ref 0–4.5)
SODIUM SERPL-SCNC: 143 MMOL/L (ref 136–145)
TRIGL SERPL-MCNC: 126 MG/DL

## 2022-06-01 PROCEDURE — G0103 PSA SCREENING: HCPCS | Performed by: PHYSICIAN ASSISTANT

## 2022-06-01 PROCEDURE — 80061 LIPID PANEL: CPT | Performed by: PHYSICIAN ASSISTANT

## 2022-06-01 PROCEDURE — 80053 COMPREHEN METABOLIC PANEL: CPT | Performed by: PHYSICIAN ASSISTANT

## 2022-07-31 ENCOUNTER — HOSPITAL ENCOUNTER (EMERGENCY)
Facility: HOSPITAL | Age: 72
Discharge: HOME | End: 2022-07-31
Attending: EMERGENCY MEDICINE | Admitting: EMERGENCY MEDICINE
Payer: COMMERCIAL

## 2022-07-31 VITALS
DIASTOLIC BLOOD PRESSURE: 78 MMHG | BODY MASS INDEX: 38.22 KG/M2 | WEIGHT: 273 LBS | TEMPERATURE: 97.2 F | RESPIRATION RATE: 20 BRPM | SYSTOLIC BLOOD PRESSURE: 141 MMHG | HEART RATE: 117 BPM | HEIGHT: 71 IN | OXYGEN SATURATION: 95 %

## 2022-07-31 DIAGNOSIS — R20.2 ARM PARESTHESIA, LEFT: ICD-10-CM

## 2022-07-31 PROCEDURE — 99283 EMERGENCY DEPT VISIT LOW MDM: CPT

## 2022-07-31 PROCEDURE — 93005 ELECTROCARDIOGRAM TRACING: CPT | Performed by: EMERGENCY MEDICINE

## 2022-07-31 PROCEDURE — 93005 ELECTROCARDIOGRAM TRACING: CPT | Performed by: STUDENT IN AN ORGANIZED HEALTH CARE EDUCATION/TRAINING PROGRAM

## 2022-07-31 RX ORDER — METHYLPREDNISOLONE 4 MG
TABLET, DOSE PACK ORAL
Qty: 21 TABLET | Refills: 0 | Status: SHIPPED | OUTPATIENT
Start: 2022-07-31 | End: 2023-07-14

## 2022-07-31 ASSESSMENT — ENCOUNTER SYMPTOMS
SHORTNESS OF BREATH: 0
NECK STIFFNESS: 1
COUGH: 0
HEADACHES: 0
SPEECH DIFFICULTY: 0
NUMBNESS: 1
FEVER: 0

## 2022-07-31 NOTE — ED TRIAGE NOTES
"Pt presents with left shoulder tingling. Pt has hx of \"bad shoulder\" and wonders if it is just a nerve. No other neuro symptoms. Pt is having a hernia repair this week and had to stop his ASA for procedure.      Triage Assessment     Row Name 07/31/22 1411       Triage Assessment (Adult)    Airway WDL WDL       Respiratory WDL    Respiratory WDL WDL       Skin Circulation/Temperature WDL    Skin Circulation/Temperature WDL WDL       Cardiac WDL    Cardiac WDL WDL       Peripheral/Neurovascular WDL    Peripheral Neurovascular WDL neurovascular assessment upper       LUE Neurovascular Assessment    Temperature LUE warm    Color LUE no discoloration    Sensation LUE tingling present       RUE Neurovascular Assessment    Temperature RUE warm    Color RUE no discoloration    Sensation RUE no tenderness;no numbness;no tingling       Cognitive/Neuro/Behavioral WDL    Cognitive/Neuro/Behavioral WDL WDL              "

## 2022-07-31 NOTE — ED PROVIDER NOTES
"EMERGENCY DEPARTMENT ENCOUNTER       ED Course & Medical Decision Making       I saw and examined the patient.  He did have an EKG that was done from triage.  I did independently interpret EKG done today at 1844.    Normal sinus rhythm with a rate of 74 bpm.  Intervals and axis are normal.  No significant ST elevation or depression.  No pathological Q waves.  Similar to previous EKG.    Nothing about this story sounds cardiac in nature.  He is having intermittent paresthesias that I think are radiculopathy around the ulnar nerve distribution  He has had some intermittent neck pain and its likely radiculopathy from there.  I will treat him with a Medrol Dosepak and have him follow-up with primary care    5:17 PM I met with the patient, obtained history, performed an initial exam, and discussed options and plan for diagnostics and treatment here in the ED.   6:04 PM I spoke with Pharmacy about medication recommendations.   6:29 PM I rechecked and updated the patient on Pharmacy recommendations. We discussed the plan for discharge and the patient is agreeable. Reviewed supportive cares, symptomatic treatment, outpatient follow up, and reasons to return to the Emergency Department.       Prior to making a final disposition on this patient the results of patient's tests and other diagnostic studies were discussed with the patient. All questions were answered. Patient expressed understanding of the plan and was amenable to it.    Medications - No data to display    Final Impression     1. Arm paresthesia, left            Chief Complaint     Chief Complaint   Patient presents with     Tingling     Left arm       Pt presents with left shoulder tingling. Pt has hx of \"bad shoulder\" and wonders if it is just a nerve. No other neuro symptoms. Pt is having a hernia repair this week and had to stop his ASA for procedure.      Triage Assessment     Row Name 07/31/22 1411       Triage Assessment (Adult)    Airway WDL WDL       " Respiratory WDL    Respiratory WDL WDL       Skin Circulation/Temperature WDL    Skin Circulation/Temperature WDL WDL       Cardiac WDL    Cardiac WDL WDL       Peripheral/Neurovascular WDL    Peripheral Neurovascular WDL neurovascular assessment upper       LUE Neurovascular Assessment    Temperature LUE warm    Color LUE no discoloration    Sensation LUE tingling present       RUE Neurovascular Assessment    Temperature RUE warm    Color RUE no discoloration    Sensation RUE no tenderness;no numbness;no tingling       Cognitive/Neuro/Behavioral WDL    Cognitive/Neuro/Behavioral WDL WDL                  HPI       Bubba Mclaughlin is a 71 year old male with a pertinent history of osteoarthritis and morbid obesity who presents via private vehicle for evaluation of shoulder paresthesia and numbness.    Patient reports a two day history of intermittent numbness and paresthesia that radiates down his LUE. Denies any associated chest pain or dyspnea with this. He does not identify any specific exacerbating or relieving factors, though notes his symptoms reoccurred this morning after carrying several heavy shopping bags. Additionally, he endorses intermittent left sided neck stiffness that specifically occurs with extended sitting, though he notes this is not associated with his LUE symptoms. Otherwise denies cough, fever, changes in bowel or bladder function, gait difficulties, speech changes, headache, or any other symptoms or concerns at this time. Of note, patient is scheduled for a hydrocele repair this week and has discontinued his ASA in preparation for this.     ICorina, am serving as a scribe to document services personally performed by Luigi De Oliveira M.D. based on my observation and the provider's statements to me. ILuigi M.D attest that Corina Acosta is acting in a scribe capacity, has observed my performance of the services and has documented them in accordance with my direction.    Past  Medical History     Past Medical History:   Diagnosis Date     Arthritis      Colon polyps      Diverticulosis      Dyslipidemia      Family history of myocardial infarction      GERD (gastroesophageal reflux disease)      Hemorrhoids      High cholesterol      Hydrocele of testis      Hypertension      Past Surgical History:   Procedure Laterality Date     DAVINCI XI HERNIORRHAPHY INGUINAL Left 7/29/2021    Procedure: HERNIORRHAPHY, INGUINAL, ROBOT-ASSISTED, LAPAROSCOPIC, USING DA DONALD XI;  Surgeon: Demian Vazquez MD;  Location: [facility]     HERNIA REPAIR  2000,2006    and [facility]. hernia 2016     HYDROCELECTOMY SCROTAL N/A 04/10/2017    Procedure: RIGHT SCROTAL EXPLORATION, HYDROCELECTOMY;  Surgeon: Rajiv Ferris MD;  Location: [facility];  Service:      ORCHIECTOMY SCROTAL Right 04/10/2017    Procedure:  RIGHT ORCHIECTOMY;  Surgeon: Rajiv Ferris MD;  Location: [facility];  Service:      OTHER SURGICAL HISTORY      scrotal exploration     Family History   Problem Relation Age of Onset     Coronary Artery Disease Brother       Social History     Tobacco Use     Smoking status: Never Smoker     Smokeless tobacco: Never Used   Substance Use Topics     Alcohol use: No     Drug use: No       Relevant past medical, surgical, family and social history as documented above, has been reviewed and discussed with patient. No changes or additions, unless otherwise noted in the HPI.    Current Medications     methylPREDNISolone (MEDROL DOSEPAK) 4 MG tablet therapy pack  aspirin 325 MG tablet  atorvastatin (LIPITOR) 40 MG tablet  hydroCHLOROthiazide (HYDRODIURIL) 12.5 MG tablet  lactose-reduced food (BOOST HIGH PROTEIN ORAL)  losartan (COZAAR) 50 MG tablet  multivitamin therapeutic (THERAGRAN) tablet  omeprazole (PRILOSEC) 20 MG capsule  psyllium (METAMUCIL) 3.4 gram packet        Allergies     No Known Allergies    Review of Systems     Review of Systems   Constitutional: Negative for fever.  "  Respiratory: Negative for cough and shortness of breath.    Cardiovascular: Negative for chest pain.   Gastrointestinal:        Negative for changes in bowel function   Genitourinary:        Negative for changes in bladder function   Musculoskeletal: Positive for neck stiffness (left sided, intermittent). Negative for gait problem.   Neurological: Positive for numbness (LUE). Negative for speech difficulty and headaches.        Positive for paresthesia of LUE   All other systems reviewed and are negative.       Remainder of systems reviewed, unless noted in HPI all others negative.    Physical Exam     BP (!) 141/78   Pulse 117   Temp 97.2  F (36.2  C) (Temporal)   Resp 20   Ht 1.803 m (5' 11\")   Wt 123.8 kg (273 lb)   SpO2 95%   BMI 38.08 kg/m      Physical Exam  Vitals and nursing note reviewed.   Constitutional:       General: He is not in acute distress.  HENT:      Head: Normocephalic.      Nose: Nose normal.   Eyes:      General: No scleral icterus.  Cardiovascular:      Rate and Rhythm: Normal rate.   Pulmonary:      Effort: Pulmonary effort is normal.   Musculoskeletal:      Cervical back: Neck supple.   Skin:     Findings: No rash.   Neurological:      General: No focal deficit present.      Mental Status: He is alert. Mental status is at baseline.      Motor: No weakness.   Psychiatric:         Mood and Affect: Mood normal.             Labs & Imaging         Labs Ordered and Resulted from Time of ED Arrival to Time of ED Departure - No data to display           Luigi De Oliveira MD  Emergency Medicine  [facility] EMERGENCY DEPARTMENT  [address] [place] [id number]  [phone]  7/31/2022        Luigi De Oliveira MD  07/31/22 1849    "

## 2022-08-01 LAB
ATRIAL RATE - MUSE: 74 BPM
DIASTOLIC BLOOD PRESSURE - MUSE: NORMAL MMHG
INTERPRETATION ECG - MUSE: NORMAL
P AXIS - MUSE: 29 DEGREES
PR INTERVAL - MUSE: 180 MS
QRS DURATION - MUSE: 112 MS
QT - MUSE: 404 MS
QTC - MUSE: 448 MS
R AXIS - MUSE: -32 DEGREES
SYSTOLIC BLOOD PRESSURE - MUSE: NORMAL MMHG
T AXIS - MUSE: 8 DEGREES
VENTRICULAR RATE- MUSE: 74 BPM

## 2022-08-05 ENCOUNTER — LAB REQUISITION (OUTPATIENT)
Dept: LAB | Facility: CLINIC | Age: 72
End: 2022-08-05

## 2022-08-05 DIAGNOSIS — Z01.818 ENCOUNTER FOR OTHER PREPROCEDURAL EXAMINATION: ICD-10-CM

## 2022-08-05 LAB
ANION GAP SERPL CALCULATED.3IONS-SCNC: 13 MMOL/L (ref 7–15)
BUN SERPL-MCNC: 12 MG/DL (ref 8–23)
CALCIUM SERPL-MCNC: 9.3 MG/DL (ref 8.8–10.2)
CHLORIDE SERPL-SCNC: 102 MMOL/L (ref 98–107)
CREAT SERPL-MCNC: 0.73 MG/DL (ref 0.67–1.17)
DEPRECATED HCO3 PLAS-SCNC: 25 MMOL/L (ref 22–29)
GFR SERPL CREATININE-BSD FRML MDRD: >90 ML/MIN/1.73M2
GLUCOSE SERPL-MCNC: 120 MG/DL (ref 70–99)
POTASSIUM SERPL-SCNC: 4 MMOL/L (ref 3.4–5.3)
SODIUM SERPL-SCNC: 140 MMOL/L (ref 136–145)

## 2022-08-05 PROCEDURE — 80048 BASIC METABOLIC PNL TOTAL CA: CPT | Performed by: PHYSICIAN ASSISTANT

## 2022-08-08 ENCOUNTER — LAB REQUISITION (OUTPATIENT)
Dept: LAB | Facility: CLINIC | Age: 72
End: 2022-08-08
Payer: COMMERCIAL

## 2022-08-08 DIAGNOSIS — Z01.812 ENCOUNTER FOR PREPROCEDURAL LABORATORY EXAMINATION: ICD-10-CM

## 2022-08-08 LAB — SARS-COV-2 RNA RESP QL NAA+PROBE: NEGATIVE

## 2022-08-08 PROCEDURE — U0005 INFEC AGEN DETEC AMPLI PROBE: HCPCS | Mod: ORL | Performed by: PHYSICIAN ASSISTANT

## 2022-10-08 ENCOUNTER — LAB REQUISITION (OUTPATIENT)
Dept: LAB | Facility: CLINIC | Age: 72
End: 2022-10-08
Payer: COMMERCIAL

## 2022-10-08 DIAGNOSIS — R79.9 ABNORMAL FINDING OF BLOOD CHEMISTRY, UNSPECIFIED: ICD-10-CM

## 2022-10-10 LAB
CREAT SERPL-MCNC: 0.66 MG/DL (ref 0.67–1.17)
ERYTHROCYTE [DISTWIDTH] IN BLOOD BY AUTOMATED COUNT: 14.7 % (ref 10–15)
GFR SERPL CREATININE-BSD FRML MDRD: >90 ML/MIN/1.73M2
HCT VFR BLD AUTO: 34.3 % (ref 40–53)
HGB BLD-MCNC: 11.1 G/DL (ref 13.3–17.7)
MCH RBC QN AUTO: 30.5 PG (ref 26.5–33)
MCHC RBC AUTO-ENTMCNC: 32.4 G/DL (ref 31.5–36.5)
MCV RBC AUTO: 94 FL (ref 78–100)
PLATELET # BLD AUTO: 184 10E3/UL (ref 150–450)
RBC # BLD AUTO: 3.64 10E6/UL (ref 4.4–5.9)
VANCOMYCIN SERPL-MCNC: 20.7 UG/ML
WBC # BLD AUTO: 4.4 10E3/UL (ref 4–11)

## 2022-10-10 PROCEDURE — 80202 ASSAY OF VANCOMYCIN: CPT | Mod: ORL | Performed by: INTERNAL MEDICINE

## 2022-10-10 PROCEDURE — P9603 ONE-WAY ALLOW PRORATED MILES: HCPCS | Mod: ORL | Performed by: INTERNAL MEDICINE

## 2022-10-10 PROCEDURE — 36415 COLL VENOUS BLD VENIPUNCTURE: CPT | Mod: ORL | Performed by: INTERNAL MEDICINE

## 2022-10-10 PROCEDURE — 82565 ASSAY OF CREATININE: CPT | Mod: ORL | Performed by: INTERNAL MEDICINE

## 2022-10-10 PROCEDURE — 85027 COMPLETE CBC AUTOMATED: CPT | Mod: ORL | Performed by: INTERNAL MEDICINE

## 2022-10-12 ENCOUNTER — LAB REQUISITION (OUTPATIENT)
Dept: LAB | Facility: CLINIC | Age: 72
End: 2022-10-12
Payer: COMMERCIAL

## 2022-10-12 DIAGNOSIS — K59.1 FUNCTIONAL DIARRHEA: ICD-10-CM

## 2022-10-12 DIAGNOSIS — R79.9 ABNORMAL FINDING OF BLOOD CHEMISTRY, UNSPECIFIED: ICD-10-CM

## 2022-10-13 LAB
ANION GAP SERPL CALCULATED.3IONS-SCNC: 10 MMOL/L (ref 7–15)
BUN SERPL-MCNC: 4.5 MG/DL (ref 8–23)
CALCIUM SERPL-MCNC: 8.2 MG/DL (ref 8.8–10.2)
CHLORIDE SERPL-SCNC: 105 MMOL/L (ref 98–107)
CREAT SERPL-MCNC: 0.62 MG/DL (ref 0.67–1.17)
DEPRECATED HCO3 PLAS-SCNC: 26 MMOL/L (ref 22–29)
GFR SERPL CREATININE-BSD FRML MDRD: >90 ML/MIN/1.73M2
GLUCOSE SERPL-MCNC: 97 MG/DL (ref 70–99)
POTASSIUM SERPL-SCNC: 3.3 MMOL/L (ref 3.4–5.3)
SODIUM SERPL-SCNC: 141 MMOL/L (ref 136–145)
VANCOMYCIN SERPL-MCNC: 15.2 UG/ML

## 2022-10-13 PROCEDURE — 80202 ASSAY OF VANCOMYCIN: CPT | Mod: ORL

## 2022-10-13 PROCEDURE — 80048 BASIC METABOLIC PNL TOTAL CA: CPT | Mod: ORL

## 2022-10-13 PROCEDURE — P9603 ONE-WAY ALLOW PRORATED MILES: HCPCS | Mod: ORL

## 2022-10-13 PROCEDURE — 36415 COLL VENOUS BLD VENIPUNCTURE: CPT | Mod: ORL

## 2022-10-14 ENCOUNTER — LAB REQUISITION (OUTPATIENT)
Dept: LAB | Facility: CLINIC | Age: 72
End: 2022-10-14
Payer: COMMERCIAL

## 2022-10-14 DIAGNOSIS — E87.6 HYPOKALEMIA: ICD-10-CM

## 2022-10-17 LAB — POTASSIUM SERPL-SCNC: 4.3 MMOL/L (ref 3.4–5.3)

## 2022-10-17 PROCEDURE — 36415 COLL VENOUS BLD VENIPUNCTURE: CPT | Mod: ORL

## 2022-10-17 PROCEDURE — P9604 ONE-WAY ALLOW PRORATED TRIP: HCPCS | Mod: ORL

## 2022-10-17 PROCEDURE — 84132 ASSAY OF SERUM POTASSIUM: CPT | Mod: ORL

## 2023-06-01 ENCOUNTER — LAB REQUISITION (OUTPATIENT)
Dept: LAB | Facility: CLINIC | Age: 73
End: 2023-06-01

## 2023-06-01 DIAGNOSIS — L98.9 DISORDER OF THE SKIN AND SUBCUTANEOUS TISSUE, UNSPECIFIED: ICD-10-CM

## 2023-06-01 PROCEDURE — 88305 TISSUE EXAM BY PATHOLOGIST: CPT | Performed by: PATHOLOGY

## 2023-06-05 LAB
PATH REPORT.COMMENTS IMP SPEC: NORMAL
PATH REPORT.FINAL DX SPEC: NORMAL
PATH REPORT.GROSS SPEC: NORMAL
PATH REPORT.MICROSCOPIC SPEC OTHER STN: NORMAL
PATH REPORT.RELEVANT HX SPEC: NORMAL
PHOTO IMAGE: NORMAL

## 2023-06-21 ENCOUNTER — TRANSFERRED RECORDS (OUTPATIENT)
Dept: HEALTH INFORMATION MANAGEMENT | Facility: CLINIC | Age: 73
End: 2023-06-21
Payer: COMMERCIAL

## 2023-06-21 ENCOUNTER — LAB REQUISITION (OUTPATIENT)
Dept: LAB | Facility: CLINIC | Age: 73
End: 2023-06-21

## 2023-06-21 DIAGNOSIS — E78.5 HYPERLIPIDEMIA, UNSPECIFIED: ICD-10-CM

## 2023-06-21 LAB
ALBUMIN SERPL BCG-MCNC: 4.5 G/DL (ref 3.5–5.2)
ALP SERPL-CCNC: 59 U/L (ref 40–129)
ALT SERPL W P-5'-P-CCNC: 52 U/L (ref 0–70)
ANION GAP SERPL CALCULATED.3IONS-SCNC: 15 MMOL/L (ref 7–15)
AST SERPL W P-5'-P-CCNC: 42 U/L (ref 0–45)
BILIRUB SERPL-MCNC: 0.9 MG/DL
BUN SERPL-MCNC: 21.2 MG/DL (ref 8–23)
CALCIUM SERPL-MCNC: 9.5 MG/DL (ref 8.8–10.2)
CHLORIDE SERPL-SCNC: 106 MMOL/L (ref 98–107)
CHOLEST SERPL-MCNC: 119 MG/DL
CREAT SERPL-MCNC: 0.82 MG/DL (ref 0.67–1.17)
DEPRECATED HCO3 PLAS-SCNC: 22 MMOL/L (ref 22–29)
ERYTHROCYTE [DISTWIDTH] IN BLOOD BY AUTOMATED COUNT: 14 % (ref 10–15)
GFR SERPL CREATININE-BSD FRML MDRD: >90 ML/MIN/1.73M2
GLUCOSE SERPL-MCNC: 103 MG/DL (ref 70–99)
HCT VFR BLD AUTO: 48 % (ref 40–53)
HDLC SERPL-MCNC: 44 MG/DL
HGB BLD-MCNC: 16.1 G/DL (ref 13.3–17.7)
LDLC SERPL CALC-MCNC: 53 MG/DL
MCH RBC QN AUTO: 31 PG (ref 26.5–33)
MCHC RBC AUTO-ENTMCNC: 33.5 G/DL (ref 31.5–36.5)
MCV RBC AUTO: 93 FL (ref 78–100)
NONHDLC SERPL-MCNC: 75 MG/DL
PLATELET # BLD AUTO: 163 10E3/UL (ref 150–450)
POTASSIUM SERPL-SCNC: 4 MMOL/L (ref 3.4–5.3)
PROT SERPL-MCNC: 7.4 G/DL (ref 6.4–8.3)
RBC # BLD AUTO: 5.19 10E6/UL (ref 4.4–5.9)
SODIUM SERPL-SCNC: 143 MMOL/L (ref 136–145)
TRIGL SERPL-MCNC: 110 MG/DL
WBC # BLD AUTO: 4.6 10E3/UL (ref 4–11)

## 2023-06-21 PROCEDURE — 85027 COMPLETE CBC AUTOMATED: CPT | Performed by: PHYSICIAN ASSISTANT

## 2023-06-21 PROCEDURE — 80061 LIPID PANEL: CPT | Performed by: PHYSICIAN ASSISTANT

## 2023-06-21 PROCEDURE — 80053 COMPREHEN METABOLIC PANEL: CPT | Performed by: PHYSICIAN ASSISTANT

## 2023-07-03 ENCOUNTER — HOSPITAL ENCOUNTER (EMERGENCY)
Facility: HOSPITAL | Age: 73
Discharge: HOME | End: 2023-07-03
Attending: EMERGENCY MEDICINE | Admitting: EMERGENCY MEDICINE
Payer: COMMERCIAL

## 2023-07-03 ENCOUNTER — RESULTS ONLY (OUTPATIENT)
Dept: ADMINISTRATIVE | Facility: CLINIC | Age: 73
End: 2023-07-03

## 2023-07-03 ENCOUNTER — APPOINTMENT (OUTPATIENT)
Dept: CT IMAGING | Facility: HOSPITAL | Age: 73
End: 2023-07-03
Attending: EMERGENCY MEDICINE
Payer: COMMERCIAL

## 2023-07-03 VITALS
OXYGEN SATURATION: 93 % | HEART RATE: 74 BPM | TEMPERATURE: 98.3 F | WEIGHT: 250 LBS | RESPIRATION RATE: 14 BRPM | SYSTOLIC BLOOD PRESSURE: 127 MMHG | DIASTOLIC BLOOD PRESSURE: 69 MMHG | BODY MASS INDEX: 34.87 KG/M2

## 2023-07-03 DIAGNOSIS — E78.5 DYSLIPIDEMIA: ICD-10-CM

## 2023-07-03 DIAGNOSIS — M54.6 ACUTE LEFT-SIDED THORACIC BACK PAIN: ICD-10-CM

## 2023-07-03 DIAGNOSIS — E78.2 MODERATE MIXED HYPERLIPIDEMIA NOT REQUIRING STATIN THERAPY: ICD-10-CM

## 2023-07-03 LAB
ANION GAP SERPL CALCULATED.3IONS-SCNC: 11 MMOL/L (ref 7–15)
ATRIAL RATE - MUSE: 87 BPM
BASOPHILS # BLD AUTO: 0 10E3/UL (ref 0–0.2)
BASOPHILS NFR BLD AUTO: 0 %
BUN SERPL-MCNC: 17.7 MG/DL (ref 8–23)
CALCIUM SERPL-MCNC: 9.2 MG/DL (ref 8.8–10.2)
CHLORIDE SERPL-SCNC: 106 MMOL/L (ref 98–107)
CREAT SERPL-MCNC: 0.79 MG/DL (ref 0.67–1.17)
D DIMER PPP FEU-MCNC: 0.4 UG/ML FEU (ref 0–0.5)
DEPRECATED HCO3 PLAS-SCNC: 26 MMOL/L (ref 22–29)
DIASTOLIC BLOOD PRESSURE - MUSE: NORMAL MMHG
EOSINOPHIL # BLD AUTO: 0.1 10E3/UL (ref 0–0.7)
EOSINOPHIL NFR BLD AUTO: 1 %
ERYTHROCYTE [DISTWIDTH] IN BLOOD BY AUTOMATED COUNT: 14 % (ref 10–15)
GFR SERPL CREATININE-BSD FRML MDRD: >90 ML/MIN/1.73M2
GLUCOSE SERPL-MCNC: 139 MG/DL (ref 70–99)
HCT VFR BLD AUTO: 45.9 % (ref 40–53)
HGB BLD-MCNC: 15.5 G/DL (ref 13.3–17.7)
HOLD SPECIMEN: NORMAL
IMM GRANULOCYTES # BLD: 0 10E3/UL
IMM GRANULOCYTES NFR BLD: 0 %
INTERPRETATION ECG - MUSE: NORMAL
LYMPHOCYTES # BLD AUTO: 1.7 10E3/UL (ref 0.8–5.3)
LYMPHOCYTES NFR BLD AUTO: 35 %
MCH RBC QN AUTO: 31.3 PG (ref 26.5–33)
MCHC RBC AUTO-ENTMCNC: 33.8 G/DL (ref 31.5–36.5)
MCV RBC AUTO: 93 FL (ref 78–100)
MONOCYTES # BLD AUTO: 0.3 10E3/UL (ref 0–1.3)
MONOCYTES NFR BLD AUTO: 5 %
NEUTROPHILS # BLD AUTO: 2.7 10E3/UL (ref 1.6–8.3)
NEUTROPHILS NFR BLD AUTO: 59 %
NRBC # BLD AUTO: 0 10E3/UL
NRBC BLD AUTO-RTO: 0 /100
NT-PROBNP SERPL-MCNC: <36 PG/ML (ref 0–900)
P AXIS - MUSE: 47 DEGREES
PLATELET # BLD AUTO: 167 10E3/UL (ref 150–450)
POTASSIUM SERPL-SCNC: 4.1 MMOL/L (ref 3.4–5.3)
PR INTERVAL - MUSE: 174 MS
QRS DURATION - MUSE: 110 MS
QT - MUSE: 382 MS
QTC - MUSE: 459 MS
R AXIS - MUSE: -47 DEGREES
RBC # BLD AUTO: 4.95 10E6/UL (ref 4.4–5.9)
SODIUM SERPL-SCNC: 143 MMOL/L (ref 136–145)
SYSTOLIC BLOOD PRESSURE - MUSE: NORMAL MMHG
T AXIS - MUSE: 19 DEGREES
TROPONIN T SERPL HS-MCNC: 9 NG/L
TROPONIN T SERPL HS-MCNC: 9 NG/L
VENTRICULAR RATE- MUSE: 87 BPM
WBC # BLD AUTO: 4.8 10E3/UL (ref 4–11)

## 2023-07-03 PROCEDURE — 84484 ASSAY OF TROPONIN QUANT: CPT | Performed by: EMERGENCY MEDICINE

## 2023-07-03 PROCEDURE — 85004 AUTOMATED DIFF WBC COUNT: CPT | Performed by: EMERGENCY MEDICINE

## 2023-07-03 PROCEDURE — 93005 ELECTROCARDIOGRAM TRACING: CPT | Performed by: EMERGENCY MEDICINE

## 2023-07-03 PROCEDURE — 250N000011 HC RX IP 250 OP 636: Mod: JZ | Performed by: EMERGENCY MEDICINE

## 2023-07-03 PROCEDURE — 93005 ELECTROCARDIOGRAM TRACING: CPT | Performed by: STUDENT IN AN ORGANIZED HEALTH CARE EDUCATION/TRAINING PROGRAM

## 2023-07-03 PROCEDURE — 80048 BASIC METABOLIC PNL TOTAL CA: CPT | Performed by: EMERGENCY MEDICINE

## 2023-07-03 PROCEDURE — 36415 COLL VENOUS BLD VENIPUNCTURE: CPT | Performed by: STUDENT IN AN ORGANIZED HEALTH CARE EDUCATION/TRAINING PROGRAM

## 2023-07-03 PROCEDURE — 36415 COLL VENOUS BLD VENIPUNCTURE: CPT | Performed by: EMERGENCY MEDICINE

## 2023-07-03 PROCEDURE — 99285 EMERGENCY DEPT VISIT HI MDM: CPT | Mod: 25

## 2023-07-03 PROCEDURE — 85379 FIBRIN DEGRADATION QUANT: CPT | Performed by: EMERGENCY MEDICINE

## 2023-07-03 PROCEDURE — 83880 ASSAY OF NATRIURETIC PEPTIDE: CPT | Performed by: EMERGENCY MEDICINE

## 2023-07-03 PROCEDURE — 74174 CTA ABD&PLVS W/CONTRAST: CPT

## 2023-07-03 RX ORDER — IOPAMIDOL 755 MG/ML
90 INJECTION, SOLUTION INTRAVASCULAR ONCE
Status: COMPLETED | OUTPATIENT
Start: 2023-07-03 | End: 2023-07-03

## 2023-07-03 RX ADMIN — IOPAMIDOL 90 ML: 755 INJECTION, SOLUTION INTRAVENOUS at 08:53

## 2023-07-03 ASSESSMENT — ENCOUNTER SYMPTOMS
DIAPHORESIS: 0
SHORTNESS OF BREATH: 0
BACK PAIN: 0
FEVER: 0
VOMITING: 0
COUGH: 0
DIARRHEA: 0
NAUSEA: 0
CHILLS: 0

## 2023-07-03 ASSESSMENT — ACTIVITIES OF DAILY LIVING (ADL)
ADLS_ACUITY_SCORE: 35
ADLS_ACUITY_SCORE: 35

## 2023-07-03 ASSESSMENT — HEART SCORE
TROPONIN: LESS THAN OR EQUAL TO NORMAL LIMIT
ECG: NORMAL
HISTORY: SLIGHTLY SUSPICIOUS
RISK FACTORS: 1-2 RISK FACTORS
HEART SCORE: 3
AGE: 65+

## 2023-07-03 NOTE — ED TRIAGE NOTES
amb to rm18.  Pt states having achy pain in L shoulderblade area since woke up today.  Reports had similar felling for less than 1 hour yesterday morning that spontaneously resolved.  Denies CP.  Reports has been SOB and tingling sensation in L arm.  Skin w/d/p.  resp even and unlabored.  Nad.  A&Ox3.       Triage Assessment     Row Name 07/03/23 0743       Triage Assessment (Adult)    Airway WDL WDL       Respiratory WDL    Respiratory WDL WDL       Skin Circulation/Temperature WDL    Skin Circulation/Temperature WDL WDL       Cardiac WDL    Cardiac WDL WDL       Peripheral/Neurovascular WDL    Peripheral Neurovascular WDL WDL       Cognitive/Neuro/Behavioral WDL    Cognitive/Neuro/Behavioral WDL WDL

## 2023-07-03 NOTE — ED PROVIDER NOTES
EMERGENCY DEPARTMENT ENCOUNTER      NAME: Bubba Mclaughlin  AGE: 72 year old male  YOB: 1950  MRN: [id number]  EVALUATION DATE & TIME: 7/3/2023  7:41 AM    PCP: Loyda Lee    ED PROVIDER: Liana Cota M.D.      CHIEF COMPLAINT     Chief Complaint   Patient presents with     Shortness of Breath     Back Pain         FINAL IMPRESSION:     1. Acute left-sided thoracic back pain    2. Dyslipidemia    3. Moderate mixed hyperlipidemia not requiring statin therapy          MEDICAL DECISION MAKING:       Pertinent Labs & Imaging studies reviewed. (See chart for details)    72 year old male presents to the Emergency Department for evaluation of left scapular back pain.    ED Course as of 07/03/23 1813   Mon Jul 03, 2023   0844 Mr. Mclaughlin 72-year-old male who presents here with left scapular pain.  Pain started yesterday.  Is intermittent.  Not exertional.  Not associated with vomiting.  He noticed the pain appears when he shifts.   0845 No trauma.  Had some tingling in the left forearm.   0845 Denies any weakness.  No neck pain.   0845 No chest pain no shortness of breath no abdominal pain no leg swelling or rashes.   0845 Examination he is well-appearing pain is located on the scapular area is not reproducible.  Full range of motion of bilateral upper extremities does not bring on the pain.  No midline cervical or thoracic tenderness palpation.  Strength is 5/5 the upper extremities.   0845 Differential diagnosis for left scapular pain that is not exertional not reproducible notice it when he is shifting nontraumatic include but not limited to acute coronary syndrome PE dissection musculoskeletal cervical pathology CVA among others.   0846 IV was established.  Patient already took aspirin this morning.  He is currently pain-free.  EKG reveals patient to have normal sinus rhythm without ST segment elevation or depression poor anterior progression.  Left axis deviation.   0846 Initial troponin is 9 which is  reassuring high sensitive troponin pain has been going on since yesterday.   0846 D-dimer is negative.  No risk factors for pulmonary embolism.   0846 Normal bnp clinically no evidence of volume overload.  Normal hemoglobin and platelets.   0847 Patient reevaluated.  No return of pain.   0905 Re Evaluated.  Had the onset of pain when he sat up.  Short-lived.  Not radiated.  Currently gone.   1048 CT chest is interpreted in dependently by me reveals no pneumothorax formal read by radiologist.   1048 CT chest by radiologist reveals no dissection no central PE.  Patient updated.   1049 Anion Gap: 11   1053 Reevaluated.  States he had a stress test in 2019 he said he was abnormal but they put him on a machine I think is likely talking about a CT coronary angiogram and he said that was normal.   1118 Delta Troponin is 9.   1118 Discussed with patient.  He feels comfortable being discharged.  We will do a referral for the rapid access clinic.   1809 Clinical impression and decision making  71 yo male  With left upper thoracic back pain  Not exertional  No vomiting  No diaphoresis  Positional  Ekg no acute  Troponin x 2 normal for gender  Discussed the role of troponin   Discussed admission for observation  Patient declines feels comfortable going home  Plan for him to continue asa  Will do referral to rapid access clinic  Discharged ambulatory in stable condition     1812 Heat score 3         Vital Signs: Reviewed  EKG: Sinus rhythm left axis deviation poor anterior progression.  Imaging: CT chest no dissection.  Home Meds: reviewed  ED meds/abx: none  Fluids: tko    Labs  K 4.1  Cr 0.79  Wbc 4.8  Hgb 15.5  Platelets 167  #0.4 troponin 9      Medical Decision Making    History:    Supplemental history from: Heart Disease, Hyperlipidemia and Hypertension    External Record(s) reviewed: Documented in chart, if applicable.    Work Up:    Chart documentation includes differential considered and any EKGs or imaging  independently interpreted by provider, where specified.    In additional to work up documented, I considered the following work up: Documented in chart, if applicable.    External consultation:    Discussion of management with another provider: Documented in chart, if applicable    Complicating factors:    Care impacted by chronic illness: Heart Disease, Hyperlipidemia and Hypertension    Care affected by social determinants of health: N/A    Disposition considerations: Discharge. No recommendations on prescription strength medication(s). See documentation for any additional details.      Review of Previous Records    Had a office visit 6/21/23 at [facility]   History of BPH, hyperlipidemia and Hypoalbuminemia     Consults      ED COURSE     8:00 AM I introduced myself to the patient, obtained patient history, performed a physical exam, and discussed plan for ED workup including potential diagnostic laboratory/imaging studies and interventions.  8:49 AM evaluated and updated  9:12 AM updated.  No return of pain.  10:49 AM reevaluated and updated.  11:30 PM Rechecked and updated the patient. We discussed the plan for discharge and the patient is agreeable. Reviewed supportive cares, symptomatic treatment, outpatient follow up, and reasons to return to the Emergency Department. Patient to be discharged by ED RN.       At the conclusion of the encounter I discussed the results of all of the tests and the disposition. The questions were answered. The patient acknowledged understanding and was agreeable with the care plan.         MEDICATIONS GIVEN IN THE EMERGENCY:     Medications   iopamidol (ISOVUE-370) solution 90 mL (90 mLs Intravenous $Given 7/3/23 0853)       NEW PRESCRIPTIONS STARTED AT TODAY'S ER VISIT     Discharge Medication List as of 7/3/2023 11:41 AM             =================================================================    HPI     Patient information was obtained from: Patient    Use of  : N/A         Bubba Mclaughlin is a 72 year old male who presents via private car by himself with shortness of breath and back pain.     The patient reports that he has had left shoulder/back pain since yesterday. He states that the pain comes and goes and it is sharp in nature that lasts a few minutes at a time. He had 3 to 4 episodes yesterday. It comes on abruptly and he can usually shift his position and the pain will subside. He is having some numbness in the left arm with no weakness. He has take tylenol to help with the pain.     Denies vomiting, diarrhea, diaphoresis, leg swelling, rash, and abdominal pain.     REVIEW OF SYSTEMS   Review of Systems   Constitutional: Negative for chills, diaphoresis and fever.   Respiratory: Negative for cough and shortness of breath.    Cardiovascular: Negative for chest pain and leg swelling.   Gastrointestinal: Negative for diarrhea, nausea and vomiting.   Musculoskeletal: Negative for back pain.        Left shoulder pain   All other systems reviewed and are negative.       PAST MEDICAL HISTORY:     Past Medical History:   Diagnosis Date     Arthritis     osteo     Colon polyps      Diverticulosis      Dyslipidemia      Family history of myocardial infarction     brother     GERD (gastroesophageal reflux disease)      Hemorrhoids      High cholesterol      Hydrocele of testis     right     Hypertension        PAST SURGICAL HISTORY:     Past Surgical History:   Procedure Laterality Date     DAVINCI XI HERNIORRHAPHY INGUINAL Left 7/29/2021    Procedure: HERNIORRHAPHY, INGUINAL, ROBOT-ASSISTED, LAPAROSCOPIC, USING DA DONALD XI;  Surgeon: Demian Vazquez MD;  Location: [facility]     HERNIA REPAIR  2000,2006    and [facility]. hernia 2016     HYDROCELECTOMY SCROTAL N/A 04/10/2017    Procedure: RIGHT SCROTAL EXPLORATION, HYDROCELECTOMY;  Surgeon: Rajiv Ferris MD;  Location: [facility];  Service:      ORCHIECTOMY SCROTAL Right 04/10/2017    Procedure:  RIGHT  ORCHIECTOMY;  Surgeon: Rajiv Ferris MD;  Location: [facility];  Service:      OTHER SURGICAL HISTORY      scrotal exploration         CURRENT MEDICATIONS:   aspirin 325 MG tablet  atorvastatin (LIPITOR) 40 MG tablet  hydroCHLOROthiazide (HYDRODIURIL) 12.5 MG tablet  lactose-reduced food (BOOST HIGH PROTEIN ORAL)  losartan (COZAAR) 50 MG tablet  methylPREDNISolone (MEDROL DOSEPAK) 4 MG tablet therapy pack  multivitamin therapeutic (THERAGRAN) tablet  omeprazole (PRILOSEC) 20 MG capsule  psyllium (METAMUCIL) 3.4 gram packet         ALLERGIES:   No Known Allergies    FAMILY HISTORY:     Family History   Problem Relation Age of Onset     Coronary Artery Disease Brother        SOCIAL HISTORY:     Social History     Socioeconomic History     Marital status: Single   Tobacco Use     Smoking status: Never     Smokeless tobacco: Never   Substance and Sexual Activity     Alcohol use: No     Drug use: No       VITALS:   /69   Pulse 74   Temp 98.3  F (36.8  C) (Oral)   Resp 14   Wt 113.4 kg (250 lb)   SpO2 93%   BMI 34.87 kg/m      PHYSICAL EXAM     Physical Exam  Vitals and nursing note reviewed.   Constitutional:       Appearance: Normal appearance. He is not ill-appearing.   HENT:      Head: Atraumatic.      Left Ear: Tympanic membrane normal.      Nose: Nose normal.      Mouth/Throat:      Mouth: Mucous membranes are moist.      Pharynx: Oropharynx is clear.   Eyes:      Extraocular Movements: Extraocular movements intact.      Pupils: Pupils are equal, round, and reactive to light.   Cardiovascular:      Rate and Rhythm: Normal rate and regular rhythm.      Pulses: Normal pulses.      Heart sounds: Normal heart sounds.   Abdominal:      Palpations: Abdomen is soft.      Tenderness: There is no abdominal tenderness.   Musculoskeletal:      Right lower leg: No edema.      Left lower leg: No edema.      Comments: Normal range of motion    Skin:     General: Skin is warm and dry.   Neurological:       Mental Status: He is alert.      Comments: Moves upper and lower extremities equally.   Psychiatric:         Mood and Affect: Mood normal.         Behavior: Behavior normal.               LAB:     All pertinent labs reviewed and interpreted.  Labs Ordered and Resulted from Time of ED Arrival to Time of ED Departure   BASIC METABOLIC PANEL - Abnormal       Result Value    Sodium 143      Potassium 4.1      Chloride 106      Carbon Dioxide (CO2) 26      Anion Gap 11      Urea Nitrogen 17.7      Creatinine 0.79      Calcium 9.2      Glucose 139 (*)     GFR Estimate >90     TROPONIN T, HIGH SENSITIVITY - Normal    Troponin T, High Sensitivity 9     NT PROBNP INPATIENT - Normal    N terminal Pro BNP Inpatient <36     D DIMER QUANTITATIVE - Normal    D-Dimer Quantitative 0.40     TROPONIN T, HIGH SENSITIVITY - Normal    Troponin T, High Sensitivity 9     CBC WITH PLATELETS AND DIFFERENTIAL    WBC Count 4.8      RBC Count 4.95      Hemoglobin 15.5      Hematocrit 45.9      MCV 93      MCH 31.3      MCHC 33.8      RDW 14.0      Platelet Count 167      % Neutrophils 59      % Lymphocytes 35      % Monocytes 5      % Eosinophils 1      % Basophils 0      % Immature Granulocytes 0      NRBCs per 100 WBC 0      Absolute Neutrophils 2.7      Absolute Lymphocytes 1.7      Absolute Monocytes 0.3      Absolute Eosinophils 0.1      Absolute Basophils 0.0      Absolute Immature Granulocytes 0.0      Absolute NRBCs 0.0          RADIOLOGY:     Reviewed all pertinent imaging. Please see official radiology report.  CTA Chest Abdomen Pelvis w Contrast   Final Result   IMPRESSION:   1.  No acute vascular abnormality.              EKG:     EKG #1  Sinus rhythm left axis deviation poor anterior progression.    Time: [id number]    Ventricular rate 87 bmp  Axis LAD  HI interval 174 ms  QRS duration 110 ms  QT//459 ms    Compared to previous EKG on July 31, 2022 poor anterior progression seen before.  Inverted T wave in lead III no longer  seen.  I have independently reviewed and interpreted the EKG(s) documented above.      PROCEDURES:     Procedures      I, Vanessa Soto, am serving as a scribe to document services personally performed by Dr. Cota based on my observation and the provider's statements to me. I, Liana Cota MD attest that Vanessa Soto is acting in a scribe capacity, has observed my performance of the services and has documented them in accordance with my direction.    Liana Cota M.D.  Emergency Medicine  [facility] EMERGENCY DEPARTMENT  [address] [place] [id number] [phone]  Dept: [phone]     Liana Cota MD  07/03/23 1812       Liana Cota MD  07/03/23 1811

## 2023-07-03 NOTE — DISCHARGE INSTRUCTIONS
Read and Follow the discharge instructions.    The CT of your chest did not show any tearing your aorta, no pneumonia.    Continue your current aspirin.    I am doing a referral for to cardiology clinic that should be giving you a call.    Do call your primary care doctor today to make a follow-up appointment.    Take it until you get seen by the primary care doctor.    Call 911 for chest pain shortness of breath feeling fainting worsening back pain or any other concerns.

## 2023-07-09 LAB
ATRIAL RATE - MUSE: 70 BPM
ATRIAL RATE - MUSE: 87 BPM
DIASTOLIC BLOOD PRESSURE - MUSE: NORMAL MMHG
DIASTOLIC BLOOD PRESSURE - MUSE: NORMAL MMHG
INTERPRETATION ECG - MUSE: NORMAL
INTERPRETATION ECG - MUSE: NORMAL
P AXIS - MUSE: 29 DEGREES
P AXIS - MUSE: 47 DEGREES
PR INTERVAL - MUSE: 174 MS
PR INTERVAL - MUSE: 200 MS
QRS DURATION - MUSE: 110 MS
QRS DURATION - MUSE: 110 MS
QT - MUSE: 382 MS
QT - MUSE: 392 MS
QTC - MUSE: 423 MS
QTC - MUSE: 459 MS
R AXIS - MUSE: -34 DEGREES
R AXIS - MUSE: -47 DEGREES
SYSTOLIC BLOOD PRESSURE - MUSE: NORMAL MMHG
SYSTOLIC BLOOD PRESSURE - MUSE: NORMAL MMHG
T AXIS - MUSE: -3 DEGREES
T AXIS - MUSE: 19 DEGREES
VENTRICULAR RATE- MUSE: 70 BPM
VENTRICULAR RATE- MUSE: 87 BPM

## 2023-07-14 ENCOUNTER — OFFICE VISIT (OUTPATIENT)
Dept: CARDIOLOGY | Facility: CLINIC | Age: 73
End: 2023-07-14
Attending: EMERGENCY MEDICINE
Payer: COMMERCIAL

## 2023-07-14 VITALS
SYSTOLIC BLOOD PRESSURE: 110 MMHG | HEIGHT: 70 IN | WEIGHT: 246 LBS | RESPIRATION RATE: 14 BRPM | BODY MASS INDEX: 35.22 KG/M2 | HEART RATE: 88 BPM | DIASTOLIC BLOOD PRESSURE: 70 MMHG

## 2023-07-14 DIAGNOSIS — M54.6 ACUTE LEFT-SIDED THORACIC BACK PAIN: ICD-10-CM

## 2023-07-14 DIAGNOSIS — I10 ESSENTIAL HYPERTENSION WITH GOAL BLOOD PRESSURE LESS THAN 140/90: ICD-10-CM

## 2023-07-14 DIAGNOSIS — R06.09 DOE (DYSPNEA ON EXERTION): ICD-10-CM

## 2023-07-14 DIAGNOSIS — M17.0 PRIMARY OSTEOARTHRITIS OF BOTH KNEES: ICD-10-CM

## 2023-07-14 DIAGNOSIS — E78.2 MODERATE MIXED HYPERLIPIDEMIA NOT REQUIRING STATIN THERAPY: ICD-10-CM

## 2023-07-14 DIAGNOSIS — R42 VERTIGO: ICD-10-CM

## 2023-07-14 DIAGNOSIS — E78.5 DYSLIPIDEMIA: ICD-10-CM

## 2023-07-14 DIAGNOSIS — E66.01 MORBID OBESITY (H): Primary | ICD-10-CM

## 2023-07-14 PROCEDURE — 99204 OFFICE O/P NEW MOD 45 MIN: CPT | Performed by: INTERNAL MEDICINE

## 2023-07-14 RX ORDER — TAMSULOSIN HYDROCHLORIDE 0.4 MG/1
0.4 CAPSULE ORAL 2 TIMES DAILY
COMMUNITY
Start: 2023-04-11

## 2023-07-14 RX ORDER — DOCUSATE CALCIUM 240 MG
240 CAPSULE ORAL DAILY PRN
COMMUNITY
Start: 2023-01-06

## 2023-07-14 NOTE — PROGRESS NOTES
CARDIOLOGY RAPID ACCESS CLINIC CONSULT NOTE     Assessment/Plan:   1.  72-year-old gentleman with obesity, hypertension, hyperlipidemia which appear well controlled but with atypical chest pain and exertional dyspnea.  In addition, he has a murmur suggestive of aortic stenosis and severe coronary calcification noted on chest CT.  Further assessment for ischemia is warranted.  He does not feel he would be able to walk briskly on a treadmill.  We will schedule cardiac stress MRI to assess for ischemia as well as significant aortic valve disease.  2.  Hypertension well-controlled on treatment  3.  Hyperlipidemia well-controlled on treatment    Follow-up for significant MRI abnormalities.     History of Present Illness:     It is my pleasure to see Bubba Mclaughlin at the [facility] RAPID ACCESS clinic for evaluation of chest pain.    Bubba Mclaughlin is a 72 year old male with a past medical history of obesity, hypertension, hyperlipidemia, who had an extensive hospitalization last year with inguinal infection.  He has no history of coronary artery disease and had a normal pharmacologic nuclear stress test 3 years ago.  The last couple of months he has been walking on his treadmill at a slow pace for 30 minutes 3 or 4 days a week.  He has had improving activity tolerance but is somewhat limited because of knee pains.    7/3 he awakened with left upper back pain.  This pain intensified with deep breathing and was improved with aspirin therapy it gradually resolved over the next couple of days.  For evaluation he presented to the emergency room where ECG was unchanged and troponins were within normal limits.  He has had no recurrent pain since that time, but has been hesitant to get back on the treadmill.  He was found to have a heart murmur at that time which has not been noted previously.    He has had no previous chest pain.  He has had no syncope but has had some episodes of dizziness not necessarily  related to activity.  His weight has been stable.  His sleep is restorative.    Past Medical History:     Patient Active Problem List   Diagnosis     Bilateral inguinal hernia with obstruction but no gangrene     Essential hypertension with goal blood pressure less than 140/90     Dyslipidemia     Cellulitis     Cough     Elevated liver enzymes     Hydrocele     Hyperlipidemia     Hypoalbuminemia     Ischemic heart disease     Morbid obesity (H)     Osteoarthritis of knee     Other specified postprocedural states     Bilateral inguinal hernia without obstruction or gangrene, recurrence not specified     Shortness of breath     Vertigo     Left inguinal hernia     Gastro-esophageal reflux disease without esophagitis     Scrotal mass       Past Surgical History:     Past Surgical History:   Procedure Laterality Date     DAVINCI XI HERNIORRHAPHY INGUINAL Left 7/29/2021    Procedure: HERNIORRHAPHY, INGUINAL, ROBOT-ASSISTED, LAPAROSCOPIC, USING DA DONALD XI;  Surgeon: Demian Vazquez MD;  Location: [facility] [place]     HERNIA REPAIR  2000,2006    and [facility]. hernia 2016     HYDROCELECTOMY SCROTAL N/A 04/10/2017    Procedure: RIGHT SCROTAL EXPLORATION, HYDROCELECTOMY;  Surgeon: Rajiv Ferris MD;  Location: [facility];  Service:      ORCHIECTOMY SCROTAL Right 04/10/2017    Procedure:  RIGHT ORCHIECTOMY;  Surgeon: Rajiv Ferris MD;  Location: [facility];  Service:      OTHER SURGICAL HISTORY      scrotal exploration       Family History:     Family History   Problem Relation Age of Onset     Coronary Artery Disease Brother      Family history reviewed and is not pertinent to the chief complaint or presenting problem    Social History:    reports that he has never smoked. He has never used smokeless tobacco. He reports that he does not drink alcohol and does not use drugs.    Exercise: Walks on the treadmill for 30 minutes 3-4 times a week, breaking a sweat, over the last 2 months with some improvement  "in his activity tolerance during that time    Sleep: Restorative    Meds:     Current Outpatient Medications   Medication Sig Dispense Refill     aspirin 325 MG tablet [ASPIRIN 325 MG TABLET] Take 325 mg by mouth daily.       atorvastatin (LIPITOR) 40 MG tablet [ATORVASTATIN (LIPITOR) 40 MG TABLET] Take 40 mg by mouth daily.        hydroCHLOROthiazide (HYDRODIURIL) 12.5 MG tablet [HYDROCHLOROTHIAZIDE (HYDRODIURIL) 12.5 MG TABLET] Take by mouth daily.        lactose-reduced food (BOOST HIGH PROTEIN ORAL) [LACTOSE-REDUCED FOOD (BOOST HIGH PROTEIN ORAL)] Take by mouth daily.       losartan (COZAAR) 50 MG tablet [LOSARTAN (COZAAR) 50 MG TABLET] Take 50 mg by mouth daily.        multivitamin therapeutic (THERAGRAN) tablet [MULTIVITAMIN THERAPEUTIC (THERAGRAN) TABLET] Take 1 tablet by mouth daily.       psyllium (METAMUCIL) 3.4 gram packet [PSYLLIUM (METAMUCIL) 3.4 GRAM PACKET] Take 1 packet by mouth daily.        tamsulosin (FLOMAX) 0.4 MG capsule Take 0.4 mg by mouth 2 times daily       docusate calcium (SURFAK) 240 MG capsule Take 240 mg by mouth daily as needed         Allergies:   Patient has no known allergies.    Review of Systems:     Positive for dyspnea on exertion, dizziness, joint pains.     Please refer above for cardiac ROS details.       Objective:      Physical Exam  111.6 kg (246 lb)  1.765 m (5' 9.5\")  Body mass index is 35.81 kg/m .  /70 (BP Location: Right arm, Patient Position: Sitting, Cuff Size: Adult Large)   Pulse 88   Resp 14   Ht 1.765 m (5' 9.5\")   Wt 111.6 kg (246 lb)   BMI 35.81 kg/m          General Appearance : Awake, Alert, No acute distress  HEENT: No Scleral icterus; the mucous membranes were pink and moist.  Conjunctivae bilaterally injected  Neck:  No cervical bruits, jugular venous distention, or thyromegaly   Chest: The spine was straight. Chest wall symmetric.  No chest wall tenderness today.  Lungs: Respirations unlabored; the lungs are clear to auscultation.  No " wheezing   Cardiovascular: Nonpalpable point of maximal impulse.  Auscultation reveals regular first and second heart sounds with 2/6 harsh systolic murmur at the upper right sternal border.  No gallop.  Carotid, radial, and dorsalis pedal pulses are intact and symmetric.    Abdomen: Obese.  No organomegaly, masses, bruits, or tenderness. Bowels sounds are present  Extremities: No edema  Skin: No xanthelasma. Warm, Dry.  Musculoskeletal: No tenderness.  Neurologic: Alert and oriented ×3. Speech is fluent.      EKG (personally reviewed):  7/3/2023: Sinus rhythm 70 bpm left axis deviation.  No change versus prior study    Cardiac Imaging Studies:  CTA chest abdomen pelvis 7/2023:CORONARY ARTERY CALCIFICATION: Severe.  1.  No acute vascular abnormality.    Lab Review   Lab Results   Component Value Date     07/03/2023    CO2 26 07/03/2023    CO2 23 06/01/2022    BUN 17.7 07/03/2023    BUN 14 06/01/2022     Lab Results   Component Value Date    WBC 4.8 07/03/2023    HGB 15.5 07/03/2023    HCT 45.9 07/03/2023    MCV 93 07/03/2023     07/03/2023     Lab Results   Component Value Date    CHOL 119 06/21/2023    TRIG 110 06/21/2023    HDL 44 06/21/2023     No results found for: TROPONINI  No results found for: BNP  No results found for: TSH    Alexsander Rascon MD, MD [facility]      This note created using Dragon voice recognition software. Sound alike errors may have escaped editing.

## 2023-07-14 NOTE — LETTER
7/14/2023    Loyda Lee PA-C  [address] [place] [address]    RE: Bubba Mclaughlin       Dear Colleague,     I had the pleasure of seeing Bubba Mclaughlin in the [facility].    CARDIOLOGY RAPID ACCESS CLINIC CONSULT NOTE     Assessment/Plan:   1.  72-year-old gentleman with obesity, hypertension, hyperlipidemia which appear well controlled but with atypical chest pain and exertional dyspnea.  In addition, he has a murmur suggestive of aortic stenosis and severe coronary calcification noted on chest CT.  Further assessment for ischemia is warranted.  He does not feel he would be able to walk briskly on a treadmill.  We will schedule cardiac stress MRI to assess for ischemia as well as significant aortic valve disease.  2.  Hypertension well-controlled on treatment  3.  Hyperlipidemia well-controlled on treatment    Follow-up for significant MRI abnormalities.     History of Present Illness:     It is my pleasure to see Bubba Mclaughlin at the [facility] RAPID ACCESS clinic for evaluation of chest pain.    Bubba Mclaughlin is a 72 year old male with a past medical history of obesity, hypertension, hyperlipidemia, who had an extensive hospitalization last year with inguinal infection.  He has no history of coronary artery disease and had a normal pharmacologic nuclear stress test 3 years ago.  The last couple of months he has been walking on his treadmill at a slow pace for 30 minutes 3 or 4 days a week.  He has had improving activity tolerance but is somewhat limited because of knee pains.    7/3 he awakened with left upper back pain.  This pain intensified with deep breathing and was improved with aspirin therapy it gradually resolved over the next couple of days.  For evaluation he presented to the emergency room where ECG was unchanged and troponins were within normal limits.  He has had no recurrent pain since that time, but has been hesitant to get back on the  treadmill.  He was found to have a heart murmur at that time which has not been noted previously.    He has had no previous chest pain.  He has had no syncope but has had some episodes of dizziness not necessarily related to activity.  His weight has been stable.  His sleep is restorative.    Past Medical History:     Patient Active Problem List   Diagnosis    Bilateral inguinal hernia with obstruction but no gangrene    Essential hypertension with goal blood pressure less than 140/90    Dyslipidemia    Cellulitis    Cough    Elevated liver enzymes    Hydrocele    Hyperlipidemia    Hypoalbuminemia    Ischemic heart disease    Morbid obesity (H)    Osteoarthritis of knee    Other specified postprocedural states    Bilateral inguinal hernia without obstruction or gangrene, recurrence not specified    Shortness of breath    Vertigo    Left inguinal hernia    Gastro-esophageal reflux disease without esophagitis    Scrotal mass       Past Surgical History:     Past Surgical History:   Procedure Laterality Date    DAVINCI XI HERNIORRHAPHY INGUINAL Left 7/29/2021    Procedure: HERNIORRHAPHY, INGUINAL, ROBOT-ASSISTED, LAPAROSCOPIC, USING DA DONALD XI;  Surgeon: Demian Vazquez MD;  Location: [facility] [place]    HERNIA REPAIR  2000,2006    and [facility]. hernia 2016    HYDROCELECTOMY SCROTAL N/A 04/10/2017    Procedure: RIGHT SCROTAL EXPLORATION, HYDROCELECTOMY;  Surgeon: Rajiv Ferris MD;  Location: [facility];  Service:     ORCHIECTOMY SCROTAL Right 04/10/2017    Procedure:  RIGHT ORCHIECTOMY;  Surgeon: Rajiv Ferris MD;  Location: [facility];  Service:     OTHER SURGICAL HISTORY      scrotal exploration       Family History:     Family History   Problem Relation Age of Onset    Coronary Artery Disease Brother      Family history reviewed and is not pertinent to the chief complaint or presenting problem    Social History:    reports that he has never smoked. He has never used smokeless tobacco. He  "reports that he does not drink alcohol and does not use drugs.    Exercise: Walks on the treadmill for 30 minutes 3-4 times a week, breaking a sweat, over the last 2 months with some improvement in his activity tolerance during that time    Sleep: Restorative    Meds:     Current Outpatient Medications   Medication Sig Dispense Refill    aspirin 325 MG tablet [ASPIRIN 325 MG TABLET] Take 325 mg by mouth daily.      atorvastatin (LIPITOR) 40 MG tablet [ATORVASTATIN (LIPITOR) 40 MG TABLET] Take 40 mg by mouth daily.       hydroCHLOROthiazide (HYDRODIURIL) 12.5 MG tablet [HYDROCHLOROTHIAZIDE (HYDRODIURIL) 12.5 MG TABLET] Take by mouth daily.       lactose-reduced food (BOOST HIGH PROTEIN ORAL) [LACTOSE-REDUCED FOOD (BOOST HIGH PROTEIN ORAL)] Take by mouth daily.      losartan (COZAAR) 50 MG tablet [LOSARTAN (COZAAR) 50 MG TABLET] Take 50 mg by mouth daily.       multivitamin therapeutic (THERAGRAN) tablet [MULTIVITAMIN THERAPEUTIC (THERAGRAN) TABLET] Take 1 tablet by mouth daily.      psyllium (METAMUCIL) 3.4 gram packet [PSYLLIUM (METAMUCIL) 3.4 GRAM PACKET] Take 1 packet by mouth daily.       tamsulosin (FLOMAX) 0.4 MG capsule Take 0.4 mg by mouth 2 times daily      docusate calcium (SURFAK) 240 MG capsule Take 240 mg by mouth daily as needed         Allergies:   Patient has no known allergies.    Review of Systems:     Positive for dyspnea on exertion, dizziness, joint pains.     Please refer above for cardiac ROS details.       Objective:      Physical Exam  111.6 kg (246 lb)  1.765 m (5' 9.5\")  Body mass index is 35.81 kg/m .  /70 (BP Location: Right arm, Patient Position: Sitting, Cuff Size: Adult Large)   Pulse 88   Resp 14   Ht 1.765 m (5' 9.5\")   Wt 111.6 kg (246 lb)   BMI 35.81 kg/m          General Appearance : Awake, Alert, No acute distress  HEENT: No Scleral icterus; the mucous membranes were pink and moist.  Conjunctivae bilaterally injected  Neck:  No cervical bruits, jugular venous " distention, or thyromegaly   Chest: The spine was straight. Chest wall symmetric.  No chest wall tenderness today.  Lungs: Respirations unlabored; the lungs are clear to auscultation.  No wheezing   Cardiovascular: Nonpalpable point of maximal impulse.  Auscultation reveals regular first and second heart sounds with 2/6 harsh systolic murmur at the upper right sternal border.  No gallop.  Carotid, radial, and dorsalis pedal pulses are intact and symmetric.    Abdomen: Obese.  No organomegaly, masses, bruits, or tenderness. Bowels sounds are present  Extremities: No edema  Skin: No xanthelasma. Warm, Dry.  Musculoskeletal: No tenderness.  Neurologic: Alert and oriented ×3. Speech is fluent.      EKG (personally reviewed):  7/3/2023: Sinus rhythm 70 bpm left axis deviation.  No change versus prior study    Cardiac Imaging Studies:  CTA chest abdomen pelvis 7/2023:CORONARY ARTERY CALCIFICATION: Severe.  1.  No acute vascular abnormality.    Lab Review   Lab Results   Component Value Date     07/03/2023    CO2 26 07/03/2023    CO2 23 06/01/2022    BUN 17.7 07/03/2023    BUN 14 06/01/2022     Lab Results   Component Value Date    WBC 4.8 07/03/2023    HGB 15.5 07/03/2023    HCT 45.9 07/03/2023    MCV 93 07/03/2023     07/03/2023     Lab Results   Component Value Date    CHOL 119 06/21/2023    TRIG 110 06/21/2023    HDL 44 06/21/2023     No results found for: TROPONINI  No results found for: BNP  No results found for: TSH    Alexsander Rascon MD, MD [facility]      This note created using Dragon voice recognition software. Sound alike errors may have escaped editing.         Thank you for allowing me to participate in the care of your patient.      Sincerely,     Alexsander Rascon MD      Heart Care  cc:   Liana Cota MD  [facility]  [address]  [address]  [place] [address]

## 2023-07-14 NOTE — PATIENT INSTRUCTIONS
We will schedule a cardiac stress MRI with aortic stenosis assessment to check out your heart valves, as well as make sure heart artery narrowings are not contributing to your symptoms.  If this checks out okay, continue your regular exercise regimen.  There is no substitute!  Your blood pressure and cholesterol look well controlled at this point.  Keep up the good work!

## 2023-09-28 ENCOUNTER — HOSPITAL ENCOUNTER (OUTPATIENT)
Dept: MRI IMAGING | Facility: HOSPITAL | Age: 73
Discharge: HOME | End: 2023-09-28
Attending: INTERNAL MEDICINE
Payer: COMMERCIAL

## 2023-09-28 VITALS — HEART RATE: 65 BPM | SYSTOLIC BLOOD PRESSURE: 104 MMHG | OXYGEN SATURATION: 93 % | DIASTOLIC BLOOD PRESSURE: 69 MMHG

## 2023-09-28 DIAGNOSIS — M54.6 ACUTE LEFT-SIDED THORACIC BACK PAIN: ICD-10-CM

## 2023-09-28 DIAGNOSIS — R42 VERTIGO: ICD-10-CM

## 2023-09-28 DIAGNOSIS — R06.09 DOE (DYSPNEA ON EXERTION): ICD-10-CM

## 2023-09-28 DIAGNOSIS — E78.2 MODERATE MIXED HYPERLIPIDEMIA NOT REQUIRING STATIN THERAPY: ICD-10-CM

## 2023-09-28 DIAGNOSIS — E78.5 DYSLIPIDEMIA: ICD-10-CM

## 2023-09-28 DIAGNOSIS — E66.01 MORBID OBESITY (H): ICD-10-CM

## 2023-09-28 DIAGNOSIS — M17.0 PRIMARY OSTEOARTHRITIS OF BOTH KNEES: ICD-10-CM

## 2023-09-28 DIAGNOSIS — I10 ESSENTIAL HYPERTENSION WITH GOAL BLOOD PRESSURE LESS THAN 140/90: ICD-10-CM

## 2023-09-28 LAB
ATRIAL RATE - MUSE: 65 BPM
ATRIAL RATE - MUSE: 67 BPM
DIASTOLIC BLOOD PRESSURE - MUSE: NORMAL MMHG
DIASTOLIC BLOOD PRESSURE - MUSE: NORMAL MMHG
INTERPRETATION ECG - MUSE: NORMAL
INTERPRETATION ECG - MUSE: NORMAL
P AXIS - MUSE: 24 DEGREES
P AXIS - MUSE: 25 DEGREES
PR INTERVAL - MUSE: 192 MS
PR INTERVAL - MUSE: 200 MS
QRS DURATION - MUSE: 114 MS
QRS DURATION - MUSE: 116 MS
QT - MUSE: 402 MS
QT - MUSE: 410 MS
QTC - MUSE: 424 MS
QTC - MUSE: 426 MS
R AXIS - MUSE: -31 DEGREES
R AXIS - MUSE: -31 DEGREES
SYSTOLIC BLOOD PRESSURE - MUSE: NORMAL MMHG
SYSTOLIC BLOOD PRESSURE - MUSE: NORMAL MMHG
T AXIS - MUSE: -1 DEGREES
T AXIS - MUSE: -2 DEGREES
VENTRICULAR RATE- MUSE: 65 BPM
VENTRICULAR RATE- MUSE: 67 BPM

## 2023-09-28 PROCEDURE — 250N000011 HC RX IP 250 OP 636: Performed by: INTERNAL MEDICINE

## 2023-09-28 PROCEDURE — 255N000002 HC RX 255 OP 636: Mod: JZ | Performed by: INTERNAL MEDICINE

## 2023-09-28 PROCEDURE — A9585 GADOBUTROL INJECTION: HCPCS | Mod: JZ | Performed by: INTERNAL MEDICINE

## 2023-09-28 PROCEDURE — 93005 ELECTROCARDIOGRAM TRACING: CPT

## 2023-09-28 PROCEDURE — 75563 CARD MRI W/STRESS IMG & DYE: CPT | Mod: 26 | Performed by: INTERNAL MEDICINE

## 2023-09-28 PROCEDURE — 93010 ELECTROCARDIOGRAM REPORT: CPT | Performed by: INTERNAL MEDICINE

## 2023-09-28 PROCEDURE — 93016 CV STRESS TEST SUPVJ ONLY: CPT | Performed by: INTERNAL MEDICINE

## 2023-09-28 PROCEDURE — 75563 CARD MRI W/STRESS IMG & DYE: CPT

## 2023-09-28 PROCEDURE — 999N000122 MR MYOCARDIUM  OVERREAD

## 2023-09-28 PROCEDURE — 93018 CV STRESS TEST I&R ONLY: CPT | Performed by: INTERNAL MEDICINE

## 2023-09-28 RX ORDER — CAFFEINE CITRATE 20 MG/ML
60 SOLUTION INTRAVENOUS
Status: DISCONTINUED | OUTPATIENT
Start: 2023-09-28 | End: 2023-09-28 | Stop reason: HOSPADM

## 2023-09-28 RX ORDER — GADOBUTROL 604.72 MG/ML
20 INJECTION INTRAVENOUS ONCE
Status: COMPLETED | OUTPATIENT
Start: 2023-09-28 | End: 2023-09-28

## 2023-09-28 RX ORDER — ALBUTEROL SULFATE 0.83 MG/ML
2.5 SOLUTION RESPIRATORY (INHALATION)
Status: DISCONTINUED | OUTPATIENT
Start: 2023-09-28 | End: 2023-09-28 | Stop reason: HOSPADM

## 2023-09-28 RX ORDER — REGADENOSON 0.08 MG/ML
0.4 INJECTION, SOLUTION INTRAVENOUS ONCE
Status: COMPLETED | OUTPATIENT
Start: 2023-09-28 | End: 2023-09-28

## 2023-09-28 RX ORDER — CAFFEINE 200 MG
200 TABLET ORAL
Status: DISCONTINUED | OUTPATIENT
Start: 2023-09-28 | End: 2023-09-28 | Stop reason: HOSPADM

## 2023-09-28 RX ORDER — AMINOPHYLLINE 25 MG/ML
50 INJECTION, SOLUTION INTRAVENOUS
Status: DISCONTINUED | OUTPATIENT
Start: 2023-09-28 | End: 2023-09-28 | Stop reason: HOSPADM

## 2023-09-28 RX ADMIN — GADOBUTROL 20 ML: 604.72 INJECTION INTRAVENOUS at 08:31

## 2023-09-28 RX ADMIN — REGADENOSON 0.4 MG: 0.08 INJECTION, SOLUTION INTRAVENOUS at 08:41

## 2024-06-26 ENCOUNTER — LAB REQUISITION (OUTPATIENT)
Dept: LAB | Facility: CLINIC | Age: 74
End: 2024-06-26

## 2024-06-26 DIAGNOSIS — E78.5 HYPERLIPIDEMIA, UNSPECIFIED: ICD-10-CM

## 2024-06-26 LAB
ALBUMIN SERPL BCG-MCNC: 4.4 G/DL (ref 3.5–5.2)
ALP SERPL-CCNC: 55 U/L (ref 40–150)
ALT SERPL W P-5'-P-CCNC: 37 U/L (ref 0–70)
ANION GAP SERPL CALCULATED.3IONS-SCNC: 13 MMOL/L (ref 7–15)
AST SERPL W P-5'-P-CCNC: 36 U/L (ref 0–45)
BILIRUB SERPL-MCNC: 0.8 MG/DL
BUN SERPL-MCNC: 19.4 MG/DL (ref 8–23)
CALCIUM SERPL-MCNC: 9.3 MG/DL (ref 8.8–10.2)
CHLORIDE SERPL-SCNC: 103 MMOL/L (ref 98–107)
CHOLEST SERPL-MCNC: 111 MG/DL
CREAT SERPL-MCNC: 0.87 MG/DL (ref 0.67–1.17)
DEPRECATED HCO3 PLAS-SCNC: 25 MMOL/L (ref 22–29)
EGFRCR SERPLBLD CKD-EPI 2021: >90 ML/MIN/1.73M2
FASTING STATUS PATIENT QL REPORTED: ABNORMAL
FASTING STATUS PATIENT QL REPORTED: NORMAL
GLUCOSE SERPL-MCNC: 102 MG/DL (ref 70–99)
HDLC SERPL-MCNC: 41 MG/DL
LDLC SERPL CALC-MCNC: 48 MG/DL
NONHDLC SERPL-MCNC: 70 MG/DL
POTASSIUM SERPL-SCNC: 3.8 MMOL/L (ref 3.4–5.3)
PROT SERPL-MCNC: 7.4 G/DL (ref 6.4–8.3)
SODIUM SERPL-SCNC: 141 MMOL/L (ref 135–145)
TRIGL SERPL-MCNC: 112 MG/DL

## 2024-06-26 PROCEDURE — 80053 COMPREHEN METABOLIC PANEL: CPT | Performed by: PHYSICIAN ASSISTANT

## 2024-06-26 PROCEDURE — 80061 LIPID PANEL: CPT | Performed by: PHYSICIAN ASSISTANT

## 2024-08-05 NOTE — LETTER
Last OV: 4/24/2024  Next OV: 10/24/2024        Last fill: 5/13/2024  Refills: 1         Letter by Lucy Nicholas CMA at      Author: Lucy Nicholas CMA Service: -- Author Type: --    Filed:  Encounter Date: 1/29/2021 Status: (Other)       Demarcus Mca,    We've received instruction to get you scheduled for Robotic Left Inguinal Hernia Repair  with Dr Demian Vazquez. We have that arranged as follows:     Surgery Date: Thursday February 18th  Location:  [place] [address]   Arrival Time: 5:30 AM (unless instructed otherwise by the pre-op nurse)    Prep Instructions:     1. Please schedule a pre-op physical with your primary care doctor. This may be virtual or face-to-face depending on your doctors preference. Call them right away to schedule this.    2. COVID19 testing is required within 4 days of surgery. We have this scheduled for you at [facility], [address] [place] [address], 1st Floor on Monday Feb. 15th at 10:00 AM. Follow the specific instructions you receive by Zak. If your test is positive, your surgery will be canceled.     3. Nothing to eat or drink for 8 hours before surgery unless instructed differently by the pre-op nurse.    4. No blood thinners including aspirin for one week prior to surgery. Verify this is safe for you with your primary care doctor before stopping.     5. Visitor restrictions are in place due to the pandemic. One visitor is allowed for adult surgical patients. Please verify this with the pre-op nurse when they call before surgery because it is subject to change.    6. If you are going home the same day of surgery, you need an adult to drive you home and stay with you 24 hours after surgery.      7. When you arrive to the hospital, you will be screened for COVID19 symptoms. If you screen positive, your surgery will need to be postponed for your safety.    8. The community is experiencing a surge in COVID19 hospital admissions which is impacting bed availability at all hospitals. If you are being admitted overnight or longer following surgery,  please be aware that your procedure could be cancelled as a result.     9. We always encourage you to notify your insurance any time you have something scheduled including surgery. The number is usually right on the back of your insurance card. Please call [facility] Cost of Care at [phone] if you need pricing information.     Call our office if you have any questions! Thank you!     Lucy WOOD  Surgery Scheduler  [facility]  Surgery Clinic [facility]  Direct line: [phone]   Main Line: [phone]

## 2025-06-11 ENCOUNTER — LAB REQUISITION (OUTPATIENT)
Dept: LAB | Facility: CLINIC | Age: 75
End: 2025-06-11

## 2025-06-11 DIAGNOSIS — E78.5 HYPERLIPIDEMIA, UNSPECIFIED: ICD-10-CM

## 2025-06-11 DIAGNOSIS — Z12.5 ENCOUNTER FOR SCREENING FOR MALIGNANT NEOPLASM OF PROSTATE: ICD-10-CM

## 2025-06-11 LAB
ALBUMIN SERPL BCG-MCNC: 4.3 G/DL (ref 3.5–5.2)
ALP SERPL-CCNC: 57 U/L (ref 40–150)
ALT SERPL W P-5'-P-CCNC: 39 U/L (ref 0–70)
ANION GAP SERPL CALCULATED.3IONS-SCNC: 13 MMOL/L (ref 7–15)
AST SERPL W P-5'-P-CCNC: 38 U/L (ref 0–45)
BILIRUB SERPL-MCNC: 1 MG/DL
BUN SERPL-MCNC: 12.3 MG/DL (ref 8–23)
CALCIUM SERPL-MCNC: 9.4 MG/DL (ref 8.8–10.4)
CHLORIDE SERPL-SCNC: 103 MMOL/L (ref 98–107)
CHOLEST SERPL-MCNC: 113 MG/DL
CREAT SERPL-MCNC: 0.79 MG/DL (ref 0.67–1.17)
EGFRCR SERPLBLD CKD-EPI 2021: >90 ML/MIN/1.73M2
FASTING STATUS PATIENT QL REPORTED: YES
FASTING STATUS PATIENT QL REPORTED: YES
GLUCOSE SERPL-MCNC: 113 MG/DL (ref 70–99)
HCO3 SERPL-SCNC: 24 MMOL/L (ref 22–29)
HDLC SERPL-MCNC: 37 MG/DL
LDLC SERPL CALC-MCNC: 46 MG/DL
NONHDLC SERPL-MCNC: 76 MG/DL
POTASSIUM SERPL-SCNC: 3.7 MMOL/L (ref 3.4–5.3)
PROT SERPL-MCNC: 7.4 G/DL (ref 6.4–8.3)
PSA SERPL DL<=0.01 NG/ML-MCNC: 1.26 NG/ML (ref 0–6.5)
SODIUM SERPL-SCNC: 140 MMOL/L (ref 135–145)
TRIGL SERPL-MCNC: 151 MG/DL

## 2025-06-11 PROCEDURE — 83718 ASSAY OF LIPOPROTEIN: CPT | Performed by: PHYSICIAN ASSISTANT

## 2025-06-11 PROCEDURE — 84132 ASSAY OF SERUM POTASSIUM: CPT | Performed by: PHYSICIAN ASSISTANT

## 2025-06-11 PROCEDURE — G0103 PSA SCREENING: HCPCS | Performed by: PHYSICIAN ASSISTANT

## (undated) DEVICE — SU VICRYL+ 3-0 27IN SH UND VCP416H

## (undated) DEVICE — CUSTOM PACK LAP CHOLE SBA5BLCHEA

## (undated) DEVICE — CATH TRAY FOLEY SURESTEP 16FR DRAIN BAG STATOCK A899916

## (undated) DEVICE — ENDO TROCAR OPTICAL ACCESS KII Z-THRD 05X100MM CTR03

## (undated) DEVICE — PREP DURAPREP 26ML APL 8630

## (undated) DEVICE — DRESSING COVERLET STRIP 3/4 X 3 LF 230

## (undated) DEVICE — DAVINCI XI DRAPE COLUMN 470341

## (undated) DEVICE — LUBRICANT INST ELECTROLUBE EL101

## (undated) DEVICE — DAVINCI XI OBTURATOR BLADELESS 8MM 470359

## (undated) DEVICE — DECANTER VIAL 2006S

## (undated) DEVICE — DAVINCI XI SEAL UNIVERSAL 5-8MM 470361

## (undated) DEVICE — DAVINCI HOT SHEARS TIP COVER  400180

## (undated) DEVICE — SOL WATER IRRIG 1000ML BOTTLE 2F7114

## (undated) DEVICE — DRAPE U SPLIT 74X120" 29440

## (undated) DEVICE — SYR 50ML SLIP TIP W/O NDL 309654

## (undated) DEVICE — DRSG STERI STRIP 1/2X4" R1547

## (undated) DEVICE — SU MONOCRYL+ 4-0 18IN PS2 UND MCP496G

## (undated) DEVICE — PLATE GROUNDING ADULT W/CORD 9165L

## (undated) DEVICE — GLOVE BIOGEL PI ORTHOPRO SZ 7.5 47675

## (undated) DEVICE — DAVINCI XI DRAPE ARM 470015

## (undated) DEVICE — SUTURE VICRYL+ 2-0 27IN SH UND VCP417H

## (undated) DEVICE — PAD POS XL 1X20X40IN PINK PIGAZZI

## (undated) DEVICE — SU WND CLOSURE V-LOC 90 SZ 2-0 12" GS-21 VLOCM0315

## (undated) RX ORDER — BUPIVACAINE HYDROCHLORIDE 2.5 MG/ML
INJECTION, SOLUTION INFILTRATION; PERINEURAL
Status: DISPENSED
Start: 2021-07-29